# Patient Record
Sex: MALE | Race: WHITE | NOT HISPANIC OR LATINO
[De-identification: names, ages, dates, MRNs, and addresses within clinical notes are randomized per-mention and may not be internally consistent; named-entity substitution may affect disease eponyms.]

---

## 2019-11-26 PROBLEM — Z00.00 ENCOUNTER FOR PREVENTIVE HEALTH EXAMINATION: Status: ACTIVE | Noted: 2019-11-26

## 2022-11-21 ENCOUNTER — NON-APPOINTMENT (OUTPATIENT)
Age: 32
End: 2022-11-21

## 2022-11-21 ENCOUNTER — APPOINTMENT (OUTPATIENT)
Dept: CARDIOLOGY | Facility: CLINIC | Age: 32
End: 2022-11-21
Payer: COMMERCIAL

## 2022-11-21 ENCOUNTER — TRANSCRIPTION ENCOUNTER (OUTPATIENT)
Age: 32
End: 2022-11-21

## 2022-11-21 VITALS
WEIGHT: 118 LBS | BODY MASS INDEX: 18.52 KG/M2 | OXYGEN SATURATION: 99 % | SYSTOLIC BLOOD PRESSURE: 122 MMHG | TEMPERATURE: 97.8 F | DIASTOLIC BLOOD PRESSURE: 62 MMHG | HEIGHT: 67 IN

## 2022-11-21 DIAGNOSIS — Z78.9 OTHER SPECIFIED HEALTH STATUS: ICD-10-CM

## 2022-11-21 DIAGNOSIS — Z98.890 OTHER SPECIFIED POSTPROCEDURAL STATES: ICD-10-CM

## 2022-11-21 DIAGNOSIS — I37.1 NONRHEUMATIC PULMONARY VALVE INSUFFICIENCY: ICD-10-CM

## 2022-11-21 DIAGNOSIS — Q24.9 CONGENITAL MALFORMATION OF HEART, UNSPECIFIED: ICD-10-CM

## 2022-11-21 PROCEDURE — 93000 ELECTROCARDIOGRAM COMPLETE: CPT

## 2022-11-21 PROCEDURE — 99204 OFFICE O/P NEW MOD 45 MIN: CPT | Mod: 25

## 2022-11-21 NOTE — HISTORY OF PRESENT ILLNESS
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 Guallpa) about 4-5 years ago\par He was told that he developed a moderate to severe pulmonary regurgitation that would eventually need a pulmonic valve replacement. He did poorly on an cardiopulmonary fitness test and was recommended for cardiac MRI.\par He was since lost to follow up\par He notices frequent palpitations, also has some dyspnea on exertion which he feels may have worsened\par Very active job and he does not feel limited at all\par \par Pt works a  for the [employer]. Never smoker. \par \par ECG: SR with LVH, 2 PVC's\par \par

## 2022-11-21 NOTE — DISCUSSION/SUMMARY
[FreeTextEntry1] : Pt with congenital AS/PS s/p repair as an infant\par Since then he has been told of moderate-severe PI\par ECG with LVH, PVC's\par Some PVC's, [place]\par \par Will repeat TTE\par 4 day ZIO to assess PVC burden\par Consider cardiac MRI \par Likely refer to Adult congenital clinic\par  [EKG obtained to assist in diagnosis and management of assessed problem(s)] : EKG obtained to assist in diagnosis and management of assessed problem(s)

## 2022-11-21 NOTE — PHYSICAL EXAM

## 2022-11-22 ENCOUNTER — APPOINTMENT (OUTPATIENT)
Dept: INTERNAL MEDICINE | Facility: CLINIC | Age: 32
End: 2022-11-22

## 2022-11-22 PROCEDURE — 93306 TTE W/DOPPLER COMPLETE: CPT

## 2022-12-11 ENCOUNTER — NON-APPOINTMENT (OUTPATIENT)
Age: 32
End: 2022-12-11

## 2022-12-12 ENCOUNTER — NON-APPOINTMENT (OUTPATIENT)
Age: 32
End: 2022-12-12

## 2022-12-19 ENCOUNTER — APPOINTMENT (OUTPATIENT)
Dept: PEDIATRIC CARDIOLOGY | Facility: CLINIC | Age: 32
End: 2022-12-19

## 2022-12-19 VITALS — DIASTOLIC BLOOD PRESSURE: 87 MMHG | SYSTOLIC BLOOD PRESSURE: 170 MMHG

## 2022-12-19 VITALS
BODY MASS INDEX: 19.05 KG/M2 | WEIGHT: 125.66 LBS | OXYGEN SATURATION: 99 % | HEIGHT: 68.11 IN | HEART RATE: 81 BPM | DIASTOLIC BLOOD PRESSURE: 77 MMHG | SYSTOLIC BLOOD PRESSURE: 183 MMHG

## 2022-12-19 PROCEDURE — 93000 ELECTROCARDIOGRAM COMPLETE: CPT

## 2022-12-19 PROCEDURE — 99204 OFFICE O/P NEW MOD 45 MIN: CPT | Mod: 25

## 2022-12-20 NOTE — DISCUSSION/SUMMARY
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  He does have mild AS as well that was initially evaluated at the time of surgery but no intervention was deemed necessary. With his brother's recent diagnosis of Adriana's and his phenotypic characteristics, we have given him the contact information for our cardio genomics clinic so that he may be evaluated as well.   \par \par We discussed the results of his previous Holter. He is still symptomatic and the fact that he is frequently traveling overseas it would be prudent to start a low dose beta blocker at this time. He is on board with this. We discussed the side effects with this and he will notify us should he experience any of the side effects we discussed at our visit today.\par \par We did not perform an echo but reviewed. his most recent imaging from a few weeks ago. We would prefer to obtain a cardiac MRI to better quantify his RV volume and function, quantify the degree of pulmonary insufficiency, evaluate his pulmonary arteries and evaluate his AV and ascending aorta as well. Should he be nearing criteria for a pulmonary valve replacement, he may need a cardiac CT to follow so that we may actually measure his pulmonary annulus and prepare him for transcatheter valve.\par \par We reviewed the importance of meticulous dental hygiene and the need for regular dental cleanings. We will notify him once we get any results of testing and determine follow  up at that time. \par \par With his frequent travel, we have recommended that he sign up for the patient portal and additionally that he carry copies of his echocardiogram and MRI on CD.\par \par                                            [Needs SBE Prophylaxis] : [unfilled] does not need bacterial endocarditis prophylaxis [PE + No Restrictions] : [unfilled] may participate in the entire physical education program without restriction, including all varsity competitive sports. [Influenza vaccine is recommended] : Influenza vaccine is recommended

## 2022-12-20 NOTE — PHYSICAL EXAM
[General Appearance - Alert] : alert [Facies] : the head and face were normal in appearance [Sclera] : the sclera were normal [Auscultation Breath Sounds / Voice Sounds] : breath sounds clear to auscultation bilaterally [Sternotomy] : sternotomy [Well-Healed] : well-healed [Heart Rate And Rhythm] : normal heart rate and rhythm [Heart Sounds] : normal S1 and S2 [Arterial Pulses] : normal upper and lower extremity pulses with no pulse delay [Edema] : no edema [Irregularly Irregular] : the rhythm was irregularly irregular [Systolic] : systolic [LUSB] : LUSB [Ejection] : ejection [Carotids] : the murmur was transmitted to the carotid arteries [Diastolic] : diastolic [III] : a grade 3/4  [LMSB] : LMSB  [Abdomen Soft] : soft [] : no hepato-splenomegaly [Nail Clubbing] : no clubbing  or cyanosis of the fingers [Cervical Lymph Nodes Enlarged Anterior] : The anterior cervical nodes were normal [Cervical Lymph Nodes Enlarged Posterior] : The posterior cervical nodes were normal [Skin Color & Pigmentation] : normal skin color and pigmentation [Demonstrated Behavior - Infant Nonreactive To Parents] : interactive [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

## 2022-12-20 NOTE — CARDIOLOGY SUMMARY
[Today's Date] : [unfilled] [FreeTextEntry1] : sinus rhythm with premature ventricular complexes, possible left atrial enlargement, right axis, RSR" V1 consistent with RV conduction delay, LVH with repolarization abnormality [FreeTextEntry2] : Deferred as he is having an  cardiac MRI soon

## 2022-12-20 NOTE — CONSULT LETTER
[Today's Date] : [unfilled] [Name] : Name: [unfilled] [] : : ~~ [Today's Date:] : [unfilled] [Dear  ___:] : Dear Dr. [unfilled]: [Consult] : I had the pleasure of evaluating your patient, [unfilled]. My full evaluation follows. [Consult - Single Provider] : Thank you very much for allowing me to participate in the care of this patient. If you have any questions, please do not hesitate to contact me. [Sincerely,] : Sincerely, [FreeTextEntry4] : Dr Ariel Cat MD [FreeTextEntry5] : [address]  [FreeTextEntry6] : [place]  [address].

## 2022-12-20 NOTE — REVIEW OF SYSTEMS
[Wgt Loss (___ Lbs)] : recent [unfilled] lb weight loss [Palpitations] : palpitations [Shortness Of Breath] : expressed as feeling short of breath [Sleep Disturbances] : ~T sleep disturbances [Depression] : depression [Anxiety] : anxiety [Feeling Poorly] : not feeling poorly (malaise) [Cyanosis] : no cyanosis [Edema] : no edema [Diaphoresis] : not diaphoretic [Chest Pain] : no chest pain or discomfort [Exercise Intolerance] : no persistence of exercise intolerance [Cough] : no cough [Fainting (Syncope)] : no fainting [Headache] : no headache [Dizziness] : no dizziness [Easy Bruising] : no tendency for easy bruising [FreeTextEntry3] : fatigue

## 2022-12-20 NOTE — REASON FOR VISIT
[Initial Consultation] : an initial consultation for [Patient] : patient [FreeTextEntry3] : AO Stenosis, Pulm. Regurg, Ref. by Dr Cat

## 2022-12-20 NOTE — HISTORY OF PRESENT ILLNESS
[FreeTextEntry1] : We had the pleasure of seeing RAMONA GUILLEN for evaluation today in the Adult Congenital Heart Program at [facility]. Ramona is a 32 year old male with valvar PS ans mild AS. He underwent a surgical valvotomy by Dr. León on September 10, 1992. They did examine his aortic outflow tract at the time of his surgery but that needed no intervention at the time. \par \par He is active and works as a  for the [employer] which requires travel all over the world. He is currently home spending time with his father who is terminally ill. He has very recently started Bupropion for anxiety and depression which has interfered with his daily life. He has been vaccinated for Flu and COVID. He saw an adult cardiologist last week who placed a Holter that demonstrated some very short, rare runs of symptomatic SVT. An echo at that time showed severe PI with mild to moderate pulmonary stenosis and good biventricular function.\par \par Ramona has a significant family history of congenital heart disease. 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 He is interested in genetic testing. In addition to palpitations, Ramona describes fatigue, chest discomfort, dyspnea and frequent night time awakening. \par \par \par \par \par

## 2023-01-03 ENCOUNTER — OUTPATIENT (OUTPATIENT)
Dept: OUTPATIENT SERVICES | Age: 33
LOS: 1 days | End: 2023-01-03

## 2023-01-03 ENCOUNTER — APPOINTMENT (OUTPATIENT)
Dept: MRI IMAGING | Facility: HOSPITAL | Age: 33
End: 2023-01-03
Payer: COMMERCIAL

## 2023-01-03 ENCOUNTER — RESULT REVIEW (OUTPATIENT)
Age: 33
End: 2023-01-03

## 2023-01-03 DIAGNOSIS — Q25.6 STENOSIS OF PULMONARY ARTERY: ICD-10-CM

## 2023-01-03 PROCEDURE — 75565 CARD MRI VELOC FLOW MAPPING: CPT | Mod: 26

## 2023-01-03 PROCEDURE — 71555 MRI ANGIO CHEST W OR W/O DYE: CPT | Mod: 26

## 2023-01-03 PROCEDURE — 75561 CARDIAC MRI FOR MORPH W/DYE: CPT | Mod: 26

## 2023-01-05 DIAGNOSIS — I35.0 NONRHEUMATIC AORTIC (VALVE) STENOSIS: ICD-10-CM

## 2023-01-18 ENCOUNTER — APPOINTMENT (OUTPATIENT)
Dept: PEDIATRIC MEDICAL GENETICS | Facility: CLINIC | Age: 33
End: 2023-01-18
Payer: COMMERCIAL

## 2023-01-18 PROCEDURE — 96040: CPT | Mod: 95

## 2023-01-25 ENCOUNTER — OUTPATIENT (OUTPATIENT)
Dept: OUTPATIENT SERVICES | Age: 33
LOS: 1 days | End: 2023-01-25

## 2023-01-25 ENCOUNTER — APPOINTMENT (OUTPATIENT)
Dept: CT IMAGING | Facility: HOSPITAL | Age: 33
End: 2023-01-25

## 2023-01-25 DIAGNOSIS — I37.1 NONRHEUMATIC PULMONARY VALVE INSUFFICIENCY: ICD-10-CM

## 2023-03-16 ENCOUNTER — RX CHANGE (OUTPATIENT)
Age: 33
End: 2023-03-16

## 2023-03-16 RX ORDER — METOPROLOL SUCCINATE 25 MG/1
25 TABLET, EXTENDED RELEASE ORAL DAILY
Qty: 30 | Refills: 0 | Status: DISCONTINUED | COMMUNITY
Start: 2022-12-19 | End: 2023-03-16

## 2023-03-20 DIAGNOSIS — Z01.812 ENCOUNTER FOR PREPROCEDURAL LABORATORY EXAMINATION: ICD-10-CM

## 2023-04-12 ENCOUNTER — OUTPATIENT (OUTPATIENT)
Dept: OUTPATIENT SERVICES | Facility: HOSPITAL | Age: 33
LOS: 1 days | End: 2023-04-12
Payer: COMMERCIAL

## 2023-04-12 VITALS
SYSTOLIC BLOOD PRESSURE: 130 MMHG | DIASTOLIC BLOOD PRESSURE: 78 MMHG | HEART RATE: 84 BPM | RESPIRATION RATE: 18 BRPM | TEMPERATURE: 97 F | HEIGHT: 66.5 IN | WEIGHT: 130.95 LBS | OXYGEN SATURATION: 99 %

## 2023-04-12 DIAGNOSIS — Z86.79 PERSONAL HISTORY OF OTHER DISEASES OF THE CIRCULATORY SYSTEM: Chronic | ICD-10-CM

## 2023-04-12 DIAGNOSIS — Q24.9 CONGENITAL MALFORMATION OF HEART, UNSPECIFIED: ICD-10-CM

## 2023-04-12 DIAGNOSIS — I37.1 NONRHEUMATIC PULMONARY VALVE INSUFFICIENCY: ICD-10-CM

## 2023-04-12 LAB
ANION GAP SERPL CALC-SCNC: 16 MMOL/L — HIGH (ref 7–14)
BLD GP AB SCN SERPL QL: NEGATIVE — SIGNIFICANT CHANGE UP
BUN SERPL-MCNC: 12 MG/DL — SIGNIFICANT CHANGE UP (ref 7–23)
CALCIUM SERPL-MCNC: 9.4 MG/DL — SIGNIFICANT CHANGE UP (ref 8.4–10.5)
CHLORIDE SERPL-SCNC: 104 MMOL/L — SIGNIFICANT CHANGE UP (ref 98–107)
CO2 SERPL-SCNC: 18 MMOL/L — LOW (ref 22–31)
CREAT SERPL-MCNC: 0.89 MG/DL — SIGNIFICANT CHANGE UP (ref 0.5–1.3)
EGFR: 118 ML/MIN/1.73M2 — SIGNIFICANT CHANGE UP
GLUCOSE SERPL-MCNC: 99 MG/DL — SIGNIFICANT CHANGE UP (ref 70–99)
HCT VFR BLD CALC: 44 % — SIGNIFICANT CHANGE UP (ref 39–50)
HGB BLD-MCNC: 15.1 G/DL — SIGNIFICANT CHANGE UP (ref 13–17)
MCHC RBC-ENTMCNC: 30 PG — SIGNIFICANT CHANGE UP (ref 27–34)
MCHC RBC-ENTMCNC: 34.3 GM/DL — SIGNIFICANT CHANGE UP (ref 32–36)
MCV RBC AUTO: 87.5 FL — SIGNIFICANT CHANGE UP (ref 80–100)
NRBC # BLD: 0 /100 WBCS — SIGNIFICANT CHANGE UP (ref 0–0)
NRBC # FLD: 0 K/UL — SIGNIFICANT CHANGE UP (ref 0–0)
PLATELET # BLD AUTO: 207 K/UL — SIGNIFICANT CHANGE UP (ref 150–400)
POTASSIUM SERPL-MCNC: 3.5 MMOL/L — SIGNIFICANT CHANGE UP (ref 3.5–5.3)
POTASSIUM SERPL-SCNC: 3.5 MMOL/L — SIGNIFICANT CHANGE UP (ref 3.5–5.3)
RBC # BLD: 5.03 M/UL — SIGNIFICANT CHANGE UP (ref 4.2–5.8)
RBC # FLD: 11.9 % — SIGNIFICANT CHANGE UP (ref 10.3–14.5)
RH IG SCN BLD-IMP: POSITIVE — SIGNIFICANT CHANGE UP
SODIUM SERPL-SCNC: 138 MMOL/L — SIGNIFICANT CHANGE UP (ref 135–145)
WBC # BLD: 7.14 K/UL — SIGNIFICANT CHANGE UP (ref 3.8–10.5)
WBC # FLD AUTO: 7.14 K/UL — SIGNIFICANT CHANGE UP (ref 3.8–10.5)

## 2023-04-12 PROCEDURE — 93010 ELECTROCARDIOGRAM REPORT: CPT

## 2023-04-12 RX ORDER — METOPROLOL TARTRATE 50 MG
1 TABLET ORAL
Refills: 0 | DISCHARGE

## 2023-04-12 RX ORDER — BUPROPION HYDROCHLORIDE 150 MG/1
1 TABLET, EXTENDED RELEASE ORAL
Refills: 0 | DISCHARGE

## 2023-04-12 RX ORDER — MIRTAZAPINE 45 MG/1
1 TABLET, ORALLY DISINTEGRATING ORAL
Refills: 0 | DISCHARGE

## 2023-04-12 NOTE — H&P PST ADULT - PROBLEM SELECTOR PLAN 1
This is a 34 y/o male who is scheduled for cardiac catheterization.  (Await to hear from surgeon to confirm operative procedure. Patient states he is having a pulmonary valve replacement).--emailed surgeon to confirm operative procedure  * Given preop and cleanser instructions with good teach back and patient verbalized understanding    * Ordered covid-19 pcr; given phone number and places that do covid testing to be done 3-5 days before surgery This is a 32 y/o male who is scheduled for cardiac catheterization.  (Await to hear from surgeon to confirm operative procedure. Patient states he is having a pulmonary valve replacement).--emailed surgeon to confirm operative procedure  * Given preop and cleanser instructions with good teach back and patient verbalized understanding    * Ordered covid-19 pcr; given phone number and places that do covid testing to be done 3-5 days before surgery  ADDENDUM: 4-12-23  Surgeon called to state patient is having a transcatheter pulmonary valve replacement

## 2023-04-12 NOTE — H&P PST ADULT - HISTORY OF PRESENT ILLNESS
This is a 32 y/o male who presents with congenital malformation of the heart; nonrheumatic pulmonary valve insufficiency, nonrheumatic aortic valve stenosis, and stenosis of pulmonary artery. Scheduled for cardiac catheterization.

## 2023-04-12 NOTE — H&P PST ADULT - NSICDXPASTMEDICALHX_GEN_ALL_CORE_FT
PAST MEDICAL HISTORY:  Anxiety and depression     Congenital malformation of heart     History of pulmonary artery stenosis     Hypertension     Nonrheumatic aortic valve stenosis     Nonrheumatic pulmonary valve insufficiency

## 2023-04-12 NOTE — H&P PST ADULT - PROBLEM SELECTOR PLAN 2
Await prearranged cardiac evaluation with cardiologist due to congenital malformation of heart  * Await echo and old ekg for comparison from cardiologist  * Instructed to take normal am dose of bupropion the am of surgery Await prearranged cardiac evaluation with cardiologist due to congenital malformation of heart, palpitations, and c/o mid chest pain at rest as recent as six months ago  * Await echo and old ekg for comparison from cardiologist  * Instructed to take normal am dose of bupropion the am of surgery

## 2023-04-12 NOTE — H&P PST ADULT - REASON FOR ADMISSION
as per the patient, "They are doing a cardiac catheterization and pulmonary valve replacement." (Await to hear from surgeon's office to confirm operative procedure."

## 2023-04-12 NOTE — H&P PST ADULT - CARDIOVASCULAR
details… regular rate and rhythm/S1 S2 present/no gallops/no rub/no murmur regular rate and rhythm/S1 S2 present/no gallops/no rub/murmur

## 2023-04-14 ENCOUNTER — APPOINTMENT (OUTPATIENT)
Dept: PEDIATRIC CARDIOLOGY | Facility: CLINIC | Age: 33
End: 2023-04-14
Payer: COMMERCIAL

## 2023-04-14 VITALS — DIASTOLIC BLOOD PRESSURE: 66 MMHG | SYSTOLIC BLOOD PRESSURE: 155 MMHG

## 2023-04-14 VITALS
BODY MASS INDEX: 21.72 KG/M2 | DIASTOLIC BLOOD PRESSURE: 70 MMHG | HEIGHT: 66.14 IN | OXYGEN SATURATION: 99 % | SYSTOLIC BLOOD PRESSURE: 147 MMHG | WEIGHT: 135.14 LBS | HEART RATE: 70 BPM

## 2023-04-14 PROBLEM — I10 ESSENTIAL (PRIMARY) HYPERTENSION: Chronic | Status: ACTIVE | Noted: 2023-04-12

## 2023-04-14 PROBLEM — Q24.9 CONGENITAL MALFORMATION OF HEART, UNSPECIFIED: Chronic | Status: ACTIVE | Noted: 2023-04-12

## 2023-04-14 PROBLEM — I37.1 NONRHEUMATIC PULMONARY VALVE INSUFFICIENCY: Chronic | Status: ACTIVE | Noted: 2023-04-12

## 2023-04-14 PROBLEM — I35.0 NONRHEUMATIC AORTIC (VALVE) STENOSIS: Chronic | Status: ACTIVE | Noted: 2023-04-12

## 2023-04-14 PROBLEM — F41.9 ANXIETY DISORDER, UNSPECIFIED: Chronic | Status: ACTIVE | Noted: 2023-04-12

## 2023-04-14 PROBLEM — Z86.79 PERSONAL HISTORY OF OTHER DISEASES OF THE CIRCULATORY SYSTEM: Chronic | Status: ACTIVE | Noted: 2023-04-12

## 2023-04-14 PROCEDURE — 93000 ELECTROCARDIOGRAM COMPLETE: CPT

## 2023-04-14 PROCEDURE — 99203 OFFICE O/P NEW LOW 30 MIN: CPT | Mod: 25

## 2023-04-14 NOTE — CONSULT LETTER
[Today's Date] : [unfilled] [Name] : Name: [unfilled] [] : : ~~ [Today's Date:] : [unfilled] [Dear  ___:] : Dear Dr. [unfilled]: [Consult] : I had the pleasure of evaluating your patient, [unfilled]. My full evaluation follows. [Consult - Single Provider] : Thank you very much for allowing me to participate in the care of this patient. If you have any questions, please do not hesitate to contact me. [Sincerely,] : Sincerely, [FreeTextEntry4] : Dr Ariel Cat MD [FreeTextEntry5] : [address]  [FreeTextEntry6] : [place]  [address]. [de-identified] : See note for my assessment. [de-identified] : Jarad Mobley MD, MS\par Congenital Interventional Cardiologist\par Director of Pediatric Cardiac Catheterization\par [facility]\par  of Pediatrics, [facility]\par \par [facility] [place]\par [address]\[place]  [address]\par Tel: [phone]\par Fax: [phone]\par \par [email or web address] [DrNilesh  ___] : Dr. CHACKO [___] : [unfilled]

## 2023-04-14 NOTE — PHYSICAL EXAM
[General Appearance - Alert] : alert [General Appearance - In No Acute Distress] : in no acute distress [General Appearance - Well Nourished] : well nourished [General Appearance - Well Developed] : well developed [General Appearance - Well-Appearing] : well appearing [Appearance Of Head] : the head was normocephalic [Facies] : there were no dysmorphic facial features [Sclera] : the conjunctiva were normal [Outer Ear] : the ears and nose were normal in appearance [Examination Of The Oral Cavity] : mucous membranes were moist and pink [Auscultation Breath Sounds / Voice Sounds] : breath sounds clear to auscultation bilaterally [Normal Chest Appearance] : the chest was normal in appearance [Apical Impulse] : quiet precordium with normal apical impulse [Heart Rate And Rhythm] : normal heart rate and rhythm [Heart Sounds] : normal S1 and S2 [Heart Sounds Gallop] : no gallops [Heart Sounds Pericardial Friction Rub] : no pericardial rub [Heart Sounds Click] : no clicks [Arterial Pulses] : normal upper and lower extremity pulses with no pulse delay [Edema] : no edema [Capillary Refill Test] : normal capillary refill [Systolic] : systolic [II] : a grade 2/4  [Bowel Sounds] : normal bowel sounds [Abdomen Soft] : soft [Nondistended] : nondistended [Abdomen Tenderness] : non-tender [Nail Clubbing] : no clubbing  or cyanosis of the fingers [Motor Tone] : normal muscle strength and tone [] : no rash [Skin Lesions] : no lesions [Skin Turgor] : normal turgor [Demonstrated Behavior - Infant Nonreactive To Parents] : interactive [Mood] : mood and affect were appropriate for age [Demonstrated Behavior] : normal behavior

## 2023-04-14 NOTE — REASON FOR VISIT
[Initial Consultation] : an initial consultation for [Patient] : patient [FreeTextEntry1] : Pulmonary valve stenosis s/p valvotomy [FreeTextEntry3] : Pre-catheterization consultation for TPVR

## 2023-04-14 NOTE — DISCUSSION/SUMMARY
[FreeTextEntry1] : PROBLEM LIST:\par 1. Mild AI.\par 2. Congenital PS.\par    a. s/p surgical valvotomy (10-Sept-1992, [facility]).\par 3. Free PI with severe RV dilation (RVEDVi = 174 mL/m2).\par 4. SVT on B-blocker.\par 5. Possible [place]'s syndrome.\par \par I spoke with RAMONA as a pre-procedural consult, prior to his upcoming procedure with me in the catheterization laboratory.  BUTCH has congenital PS and is s/p surgical valvotomy.  Recent evaluation has revealed progressive RV dilation prompting referral for consideration of transcatheter pulmonary valve replacement (TPVR).  I have reviewed the relevant pre-procedural imaging.  I discussed the technical aspects of the procedure, including intraprocedural decision making, as well as the risks and benefits of TPVR.  Further, I briefly reviewed the option of surgical pulmonary valve replacement (PVR).  I specifically addressed the procedural risks as well as early and late-term risks of valve embolization (requiring urgent surgery), eloina-valvar leak, post-valve ventricular arrhythmias, endocarditis and valvar dysfunction.  We discussed the current TPVR options including the Medtronic [place] valve and Hill Alterra pre-stent.  The family understands that he will require ASA and SBE prophylaxis post-valve implant, and 4 weeks of arrhythmia monitoring. I addressed all questions asked.  He wishes to proceed with TPVR in the cath lab.  We have selected a date of April 18th, 2023 to move forward.\par \par Thank you for allowing us to participate in the care of this patient.  Feel free to reach out to us with any further questions or concerns.

## 2023-04-14 NOTE — CARDIOLOGY SUMMARY
[Today's Date] : [unfilled] [FreeTextEntry1] : Sinus rhythm at a rate of 72 beats per minute with a normal VT interval.  There was incomplete RBBB and LVH.  There is no evidence of atrial enlargement or pre-excitation.  The QRS axis is normal.  The QTc is within normal limits with no ST or T-wave changes. [FreeTextEntry2] : See note for details.  Briefly, free PI; mild-moderate AI. [de-identified] : 22-Nov-2022 [de-identified] : 03-Jan-2023 [de-identified] : IMPRESSION: Right ventricular outflow tract appears patent. Pulmonary valve leaflets can be visualized with loss of phase artifact suggestive of flow acceleration. Moderate-severe pulmonary regurgitation with a regurgitant fraction of 39% by PC flow and 32% by SV difference. There is flow reversal in both branch pulmonary arteries. Normal main pulmonary artery with patent and confluent branch pulmonary arteries. Differential flow estimated is 59% to the RPA and 41% to the LPA.\par \par Moderate-severely dilated right ventricular end-diastolic volume with an RVEDVi of 174 ml/m2 (z: +6.2). If ideal body weight (67 kg) calculation is used for indexing, the RV measures moderately dilated with an RVEDVi of 155 ml/m2  (z: +4.8). There is normal RV systolic function.\par \par Tricommissural aortic valve. Possible partial fusion between the right and non coronary commissures. Normal aortic root and ascending aorta Z scores based on the BSA. Mild aortic regurgitation (14% by PC flow).\par \par Mildly dilated left ventricular volume iLVEDV 131 (z: +3.3). If ideal body weight (67 kg) calculation is used for indexing, the LV measures mildly dilated with an LVEDVi of 118 ml/m2 (z: +2.4). Normal left ventricular systolic function. Diastolic septal flattening. Mildly elevated left ventricular indexed mass LV mass (gm)/ (Indexed to BSA): 140/87 (z: +2.3).

## 2023-04-18 ENCOUNTER — INPATIENT (INPATIENT)
Facility: HOSPITAL | Age: 33
LOS: 2 days | Discharge: HOME | End: 2023-04-21
Attending: INTERNAL MEDICINE | Admitting: INTERNAL MEDICINE
Payer: COMMERCIAL

## 2023-04-18 VITALS
DIASTOLIC BLOOD PRESSURE: 40 MMHG | SYSTOLIC BLOOD PRESSURE: 96 MMHG | RESPIRATION RATE: 14 BRPM | OXYGEN SATURATION: 98 % | HEART RATE: 82 BPM

## 2023-04-18 DIAGNOSIS — Z86.79 PERSONAL HISTORY OF OTHER DISEASES OF THE CIRCULATORY SYSTEM: Chronic | ICD-10-CM

## 2023-04-18 DIAGNOSIS — Q24.9 CONGENITAL MALFORMATION OF HEART, UNSPECIFIED: ICD-10-CM

## 2023-04-18 LAB — RH IG SCN BLD-IMP: POSITIVE — SIGNIFICANT CHANGE UP

## 2023-04-18 PROCEDURE — 33477 IMPLANT TCAT PULM VLV PERQ: CPT

## 2023-04-18 PROCEDURE — 93566 NJX CAR CTH SLCTV RV/RA ANG: CPT | Mod: 59

## 2023-04-18 PROCEDURE — 93568 NJX CAR CTH NSLC P-ART ANGRP: CPT | Mod: 59

## 2023-04-18 PROCEDURE — 71045 X-RAY EXAM CHEST 1 VIEW: CPT | Mod: 26

## 2023-04-18 PROCEDURE — 93597 R&L HRT CATH CHD ABNL NT CNJ: CPT | Mod: 26,59

## 2023-04-18 PROCEDURE — 93010 ELECTROCARDIOGRAM REPORT: CPT | Mod: 76

## 2023-04-18 PROCEDURE — 33477 IMPLANT TCAT PULM VLV PERQ: CPT | Mod: 82

## 2023-04-18 PROCEDURE — 75822 VEIN X-RAY ARMS/LEGS: CPT | Mod: 26

## 2023-04-18 PROCEDURE — 76937 US GUIDE VASCULAR ACCESS: CPT | Mod: 26,59

## 2023-04-18 RX ORDER — ASPIRIN/CALCIUM CARB/MAGNESIUM 324 MG
81 TABLET ORAL DAILY
Refills: 0 | Status: DISCONTINUED | OUTPATIENT
Start: 2023-04-19 | End: 2023-04-21

## 2023-04-18 RX ORDER — SODIUM CHLORIDE 9 MG/ML
3 INJECTION INTRAMUSCULAR; INTRAVENOUS; SUBCUTANEOUS EVERY 8 HOURS
Refills: 0 | Status: DISCONTINUED | OUTPATIENT
Start: 2023-04-18 | End: 2023-04-21

## 2023-04-18 RX ORDER — BUPROPION HYDROCHLORIDE 150 MG/1
150 TABLET, EXTENDED RELEASE ORAL DAILY
Refills: 0 | Status: DISCONTINUED | OUTPATIENT
Start: 2023-04-19 | End: 2023-04-21

## 2023-04-18 RX ORDER — CEFAZOLIN SODIUM 1 G
1000 VIAL (EA) INJECTION EVERY 8 HOURS
Refills: 0 | Status: COMPLETED | OUTPATIENT
Start: 2023-04-18 | End: 2023-04-19

## 2023-04-18 RX ORDER — ACETAMINOPHEN 500 MG
650 TABLET ORAL EVERY 6 HOURS
Refills: 0 | Status: DISCONTINUED | OUTPATIENT
Start: 2023-04-18 | End: 2023-04-21

## 2023-04-18 RX ORDER — METOPROLOL TARTRATE 50 MG
25 TABLET ORAL DAILY
Refills: 0 | Status: DISCONTINUED | OUTPATIENT
Start: 2023-04-18 | End: 2023-04-21

## 2023-04-18 RX ORDER — BUPROPION HYDROCHLORIDE 150 MG/1
1 TABLET, EXTENDED RELEASE ORAL
Refills: 0 | DISCHARGE

## 2023-04-18 RX ORDER — ACETAMINOPHEN 500 MG
650 TABLET ORAL ONCE
Refills: 0 | Status: COMPLETED | OUTPATIENT
Start: 2023-04-18 | End: 2023-04-18

## 2023-04-18 RX ORDER — MIRTAZAPINE 45 MG/1
1 TABLET, ORALLY DISINTEGRATING ORAL
Refills: 0 | DISCHARGE

## 2023-04-18 RX ORDER — MIRTAZAPINE 45 MG/1
30 TABLET, ORALLY DISINTEGRATING ORAL AT BEDTIME
Refills: 0 | Status: DISCONTINUED | OUTPATIENT
Start: 2023-04-18 | End: 2023-04-21

## 2023-04-18 RX ORDER — METOPROLOL TARTRATE 50 MG
1 TABLET ORAL
Refills: 0 | DISCHARGE

## 2023-04-18 RX ADMIN — SODIUM CHLORIDE 3 MILLILITER(S): 9 INJECTION INTRAMUSCULAR; INTRAVENOUS; SUBCUTANEOUS at 21:44

## 2023-04-18 RX ADMIN — Medication 650 MILLIGRAM(S): at 13:07

## 2023-04-18 RX ADMIN — Medication 650 MILLIGRAM(S): at 23:19

## 2023-04-18 RX ADMIN — Medication 25 MILLIGRAM(S): at 21:17

## 2023-04-18 RX ADMIN — Medication 650 MILLIGRAM(S): at 23:49

## 2023-04-18 RX ADMIN — MIRTAZAPINE 30 MILLIGRAM(S): 45 TABLET, ORALLY DISINTEGRATING ORAL at 21:17

## 2023-04-18 RX ADMIN — Medication 100 MILLIGRAM(S): at 18:06

## 2023-04-18 NOTE — CHART NOTE - NSCHARTNOTEFT_GEN_A_CORE
Interventional Recovery Suite Pre-Procedure PA Note    In brief, this is a 32 y/o male with a PMHx of pulmonary stenosis s/p surgical valvotomy who presents for pre-operative cardiac catheterization for surgical pulmonary valve replacement (PVR). H&P from PST reviewed. Medication reconciliation edited/updated where appropriate. Patient is not on systemic AC. Last PO intake was 4/17/2023 PM. No history of NANCY, dentures, or loose teeth. EKG reviewed. T&S sent. Peds team to consent. Interventional Recovery Suite Pre-Procedure PA Note    In brief, this is a 32 y/o male with a PMHx of pulmonary stenosis s/p surgical valvotomy who presents for cardiac catheterization and surgical pulmonary valve replacement (PVR). H&P from PST reviewed. Medication reconciliation edited/updated where appropriate. Patient is not on systemic AC. Last PO intake was 4/17/2023 PM. No history of NANCY, dentures, or loose teeth. EKG reviewed. T&S sent. Peds team to consent.

## 2023-04-18 NOTE — PROCEDURE NOTE - ADDITIONAL PROCEDURE DETAILS
Access: 6 Fr->10 Fr-> 26Fr RFV, 5 Fr LFA, 7Fr RIJ w US guidance.    Preliminary findings  Saturations (%):   SVC: 83  LPA: 82  Elizabeth: 99    Qp: 3.9 L/min/m2  Qs: 3.9 L/min/m2  Qp:Qs: 1:1    Pressures (mmHg):   Pre-intervention  SVC: 4  RA: 5/4/3  RV: 18/5  PA: 18/5/10  LPA: 18/5/10  RPA: 16/5/10    LCWP: 6/8/6, RCWP: 6/8/6    LV: 94/6, AsAo: 94/57/71, Elizabeth: 94/57/71    Post-intervention ([place] valve placement)  RV:25/5  PA: 15/7  Elizabeth: 105/58 (78)    Angiography/Interventions:  Dilated RV with pulmonary insufficiency.  He had a Medtronic [place] TPV 25 implanted without residual PI.  Post-valve implant there was 10 mmHg pressure gradient across valve post-intervention.     Assessment: Manjit is a 33 years old male with congenital PS and is s/p surgical valvotomy. Recent evaluation has revealed progressive RV dilation.  He had an invasive assessment today which demonstrated overall unremarkable hemodynamics.  He then underwent successful implant of a Medtronic [place] TPV 25 with no residual PI.    Plan:   -Transfer to [facility] recovery then admit to Cardiac ICU for continuos monitoring.  Monitor for signs of bleeding and telemetry for arrythmia.   -Lie flat with legs straight until 4:45 pm  -2 additional doses of antibiotics  -Start Aspirin 81mg daily tomorrow  -Chest xray today   -EKG today  -Echo tomorrow, to be done by pediatric cardiology  -Patient to be discharged home on continuos telemetry for the next 4 week Access: 6 Fr->10 Fr-> 26Fr RFV, 5 Fr LFA, 7Fr RIJ w US guidance. LFV occluded.    Preliminary findings  Saturations (%):   SVC: 83  LPA: 82  Elizabeth: 99    Qp: 3.9 L/min/m2  Qs: 3.9 L/min/m2  Qp:Qs: 1:1    Pressures (mmHg):   Pre-intervention  SVC: 4  RA: 5/4/3  RV: 18/5  PA: 18/5/10  LPA: 18/5/10  RPA: 16/5/10    LCWP: 6/8/6, RCWP: 6/8/6    LV: 94/6, AsAo: 94/57/71, Elizabeth: 94/57/71    Post-intervention ([place] valve placement)  RV:25/5  PA: 15/7  Elizabeth: 105/58 (78)    Angiography/Interventions:  Dilated RV with pulmonary insufficiency.  He had a Medtronic [place] TPV 25 implanted without residual PI.  Post-valve implant there was 10 mmHg pressure gradient across valve.    Assessment: Manjit is a 33 years old male with congenital PS s/p surgical valvotomy early in life.  Recent evaluation has revealed progressive RV dilation.  Invasive assessment today demonstrated overall unremarkable hemodynamics.  He then underwent successful implant of a Medtronic [place] TPV 25 with no residual PI and no significant RVOTO.    Plan:   -Transfer to [facility] recovery then admit to Cardiac ICU for continuos monitoring.  Monitor for signs of bleeding and telemetry for arrythmia.   -Lie flat with legs straight until 4:45 pm  -2 additional doses of antibiotics  -Start Aspirin 81mg daily tomorrow  -Chest xray today   -EKG today  -Echo tomorrow, to be done by pediatric cardiology  -Patient to be discharged home on continuos telemetry for 4 weeks

## 2023-04-18 NOTE — PATIENT PROFILE ADULT - NSPROPTRIGHTREPPHONE_GEN_A_NUR
[phone]
Detail Level: Zone
Other (Free Text): Pregnancy test negative\\n\\nLot: [id number]\\nExp. 04/30/2021
Note Text (......Xxx Chief Complaint.): This diagnosis correlates with the

## 2023-04-19 ENCOUNTER — TRANSCRIPTION ENCOUNTER (OUTPATIENT)
Age: 33
End: 2023-04-19

## 2023-04-19 LAB
ALBUMIN SERPL ELPH-MCNC: 4.3 G/DL — SIGNIFICANT CHANGE UP (ref 3.3–5)
ALP SERPL-CCNC: 51 U/L — SIGNIFICANT CHANGE UP (ref 40–120)
ALT FLD-CCNC: 16 U/L — SIGNIFICANT CHANGE UP (ref 4–41)
ANION GAP SERPL CALC-SCNC: 13 MMOL/L — SIGNIFICANT CHANGE UP (ref 7–14)
APPEARANCE UR: CLEAR — SIGNIFICANT CHANGE UP
AST SERPL-CCNC: 16 U/L — SIGNIFICANT CHANGE UP (ref 4–40)
B PERT DNA SPEC QL NAA+PROBE: SIGNIFICANT CHANGE UP
B PERT+PARAPERT DNA PNL SPEC NAA+PROBE: SIGNIFICANT CHANGE UP
BASOPHILS # BLD AUTO: 0.03 K/UL — SIGNIFICANT CHANGE UP (ref 0–0.2)
BASOPHILS # BLD AUTO: 0.03 K/UL — SIGNIFICANT CHANGE UP (ref 0–0.2)
BASOPHILS NFR BLD AUTO: 0.3 % — SIGNIFICANT CHANGE UP (ref 0–2)
BASOPHILS NFR BLD AUTO: 0.3 % — SIGNIFICANT CHANGE UP (ref 0–2)
BILIRUB SERPL-MCNC: 1.1 MG/DL — SIGNIFICANT CHANGE UP (ref 0.2–1.2)
BILIRUB UR-MCNC: NEGATIVE — SIGNIFICANT CHANGE UP
BORDETELLA PARAPERTUSSIS (RAPRVP): SIGNIFICANT CHANGE UP
BUN SERPL-MCNC: 9 MG/DL — SIGNIFICANT CHANGE UP (ref 7–23)
C PNEUM DNA SPEC QL NAA+PROBE: SIGNIFICANT CHANGE UP
CALCIUM SERPL-MCNC: 8.8 MG/DL — SIGNIFICANT CHANGE UP (ref 8.4–10.5)
CHLORIDE SERPL-SCNC: 104 MMOL/L — SIGNIFICANT CHANGE UP (ref 98–107)
CO2 SERPL-SCNC: 19 MMOL/L — LOW (ref 22–31)
COLOR SPEC: SIGNIFICANT CHANGE UP
CREAT SERPL-MCNC: 0.88 MG/DL — SIGNIFICANT CHANGE UP (ref 0.5–1.3)
CRP SERPL-MCNC: 84 MG/L — HIGH
DIFF PNL FLD: NEGATIVE — SIGNIFICANT CHANGE UP
EGFR: 118 ML/MIN/1.73M2 — SIGNIFICANT CHANGE UP
EOSINOPHIL # BLD AUTO: 0.01 K/UL — SIGNIFICANT CHANGE UP (ref 0–0.5)
EOSINOPHIL # BLD AUTO: 0.04 K/UL — SIGNIFICANT CHANGE UP (ref 0–0.5)
EOSINOPHIL NFR BLD AUTO: 0.1 % — SIGNIFICANT CHANGE UP (ref 0–6)
EOSINOPHIL NFR BLD AUTO: 0.4 % — SIGNIFICANT CHANGE UP (ref 0–6)
ERYTHROCYTE [SEDIMENTATION RATE] IN BLOOD: 17 MM/HR — HIGH (ref 1–15)
FLUAV SUBTYP SPEC NAA+PROBE: SIGNIFICANT CHANGE UP
FLUBV RNA SPEC QL NAA+PROBE: SIGNIFICANT CHANGE UP
GLUCOSE SERPL-MCNC: 107 MG/DL — HIGH (ref 70–99)
GLUCOSE UR QL: NEGATIVE — SIGNIFICANT CHANGE UP
HADV DNA SPEC QL NAA+PROBE: SIGNIFICANT CHANGE UP
HCOV 229E RNA SPEC QL NAA+PROBE: SIGNIFICANT CHANGE UP
HCOV HKU1 RNA SPEC QL NAA+PROBE: SIGNIFICANT CHANGE UP
HCOV NL63 RNA SPEC QL NAA+PROBE: SIGNIFICANT CHANGE UP
HCOV OC43 RNA SPEC QL NAA+PROBE: SIGNIFICANT CHANGE UP
HCT VFR BLD CALC: 38.3 % — LOW (ref 39–50)
HCT VFR BLD CALC: 40.6 % — SIGNIFICANT CHANGE UP (ref 39–50)
HGB BLD-MCNC: 13.3 G/DL — SIGNIFICANT CHANGE UP (ref 13–17)
HGB BLD-MCNC: 14.1 G/DL — SIGNIFICANT CHANGE UP (ref 13–17)
HMPV RNA SPEC QL NAA+PROBE: SIGNIFICANT CHANGE UP
HPIV1 RNA SPEC QL NAA+PROBE: SIGNIFICANT CHANGE UP
HPIV2 RNA SPEC QL NAA+PROBE: SIGNIFICANT CHANGE UP
HPIV3 RNA SPEC QL NAA+PROBE: SIGNIFICANT CHANGE UP
HPIV4 RNA SPEC QL NAA+PROBE: SIGNIFICANT CHANGE UP
IANC: 7.18 K/UL — SIGNIFICANT CHANGE UP (ref 1.8–7.4)
IANC: 8.55 K/UL — HIGH (ref 1.8–7.4)
IMM GRANULOCYTES NFR BLD AUTO: 0.5 % — SIGNIFICANT CHANGE UP (ref 0–0.9)
IMM GRANULOCYTES NFR BLD AUTO: 0.5 % — SIGNIFICANT CHANGE UP (ref 0–0.9)
KETONES UR-MCNC: ABNORMAL
LACTATE SERPL-SCNC: 0.8 MMOL/L — SIGNIFICANT CHANGE UP (ref 0.5–2)
LEUKOCYTE ESTERASE UR-ACNC: NEGATIVE — SIGNIFICANT CHANGE UP
LYMPHOCYTES # BLD AUTO: 1.03 K/UL — SIGNIFICANT CHANGE UP (ref 1–3.3)
LYMPHOCYTES # BLD AUTO: 1.74 K/UL — SIGNIFICANT CHANGE UP (ref 1–3.3)
LYMPHOCYTES # BLD AUTO: 16.4 % — SIGNIFICANT CHANGE UP (ref 13–44)
LYMPHOCYTES # BLD AUTO: 9.3 % — LOW (ref 13–44)
M PNEUMO DNA SPEC QL NAA+PROBE: SIGNIFICANT CHANGE UP
MAGNESIUM SERPL-MCNC: 1.9 MG/DL — SIGNIFICANT CHANGE UP (ref 1.6–2.6)
MANUAL SMEAR VERIFICATION: SIGNIFICANT CHANGE UP
MCHC RBC-ENTMCNC: 30.2 PG — SIGNIFICANT CHANGE UP (ref 27–34)
MCHC RBC-ENTMCNC: 30.9 PG — SIGNIFICANT CHANGE UP (ref 27–34)
MCHC RBC-ENTMCNC: 34.7 GM/DL — SIGNIFICANT CHANGE UP (ref 32–36)
MCHC RBC-ENTMCNC: 34.7 GM/DL — SIGNIFICANT CHANGE UP (ref 32–36)
MCV RBC AUTO: 87 FL — SIGNIFICANT CHANGE UP (ref 80–100)
MCV RBC AUTO: 89 FL — SIGNIFICANT CHANGE UP (ref 80–100)
MONOCYTES # BLD AUTO: 1.37 K/UL — HIGH (ref 0–0.9)
MONOCYTES # BLD AUTO: 1.55 K/UL — HIGH (ref 0–0.9)
MONOCYTES NFR BLD AUTO: 12.4 % — SIGNIFICANT CHANGE UP (ref 2–14)
MONOCYTES NFR BLD AUTO: 14.6 % — HIGH (ref 2–14)
NEUTROPHILS # BLD AUTO: 7.18 K/UL — SIGNIFICANT CHANGE UP (ref 1.8–7.4)
NEUTROPHILS # BLD AUTO: 8.55 K/UL — HIGH (ref 1.8–7.4)
NEUTROPHILS NFR BLD AUTO: 67.8 % — SIGNIFICANT CHANGE UP (ref 43–77)
NEUTROPHILS NFR BLD AUTO: 77.4 % — HIGH (ref 43–77)
NITRITE UR-MCNC: NEGATIVE — SIGNIFICANT CHANGE UP
NRBC # BLD: 0 /100 WBCS — SIGNIFICANT CHANGE UP (ref 0–0)
NRBC # BLD: 0 /100 WBCS — SIGNIFICANT CHANGE UP (ref 0–0)
NRBC # FLD: 0 K/UL — SIGNIFICANT CHANGE UP (ref 0–0)
NRBC # FLD: 0 K/UL — SIGNIFICANT CHANGE UP (ref 0–0)
PH UR: 6.5 — SIGNIFICANT CHANGE UP (ref 5–8)
PHOSPHATE SERPL-MCNC: 3.2 MG/DL — SIGNIFICANT CHANGE UP (ref 2.5–4.5)
PLAT MORPH BLD: NORMAL — SIGNIFICANT CHANGE UP
PLATELET # BLD AUTO: 128 K/UL — LOW (ref 150–400)
PLATELET # BLD AUTO: 136 K/UL — LOW (ref 150–400)
PLATELET COUNT - ESTIMATE: ABNORMAL
POTASSIUM SERPL-MCNC: 3.5 MMOL/L — SIGNIFICANT CHANGE UP (ref 3.5–5.3)
POTASSIUM SERPL-SCNC: 3.5 MMOL/L — SIGNIFICANT CHANGE UP (ref 3.5–5.3)
PROT SERPL-MCNC: 6.3 G/DL — SIGNIFICANT CHANGE UP (ref 6–8.3)
PROT UR-MCNC: ABNORMAL
RAPID RVP RESULT: SIGNIFICANT CHANGE UP
RBC # BLD: 4.4 M/UL — SIGNIFICANT CHANGE UP (ref 4.2–5.8)
RBC # BLD: 4.56 M/UL — SIGNIFICANT CHANGE UP (ref 4.2–5.8)
RBC # FLD: 11.9 % — SIGNIFICANT CHANGE UP (ref 10.3–14.5)
RBC # FLD: 12.1 % — SIGNIFICANT CHANGE UP (ref 10.3–14.5)
RBC BLD AUTO: NORMAL — SIGNIFICANT CHANGE UP
RSV RNA SPEC QL NAA+PROBE: SIGNIFICANT CHANGE UP
RV+EV RNA SPEC QL NAA+PROBE: SIGNIFICANT CHANGE UP
SARS-COV-2 RNA SPEC QL NAA+PROBE: SIGNIFICANT CHANGE UP
SODIUM SERPL-SCNC: 136 MMOL/L — SIGNIFICANT CHANGE UP (ref 135–145)
SP GR SPEC: 1.01 — SIGNIFICANT CHANGE UP (ref 1.01–1.05)
UROBILINOGEN FLD QL: SIGNIFICANT CHANGE UP
WBC # BLD: 10.59 K/UL — HIGH (ref 3.8–10.5)
WBC # BLD: 11.05 K/UL — HIGH (ref 3.8–10.5)
WBC # FLD AUTO: 10.59 K/UL — HIGH (ref 3.8–10.5)
WBC # FLD AUTO: 11.05 K/UL — HIGH (ref 3.8–10.5)

## 2023-04-19 PROCEDURE — 93303 ECHO TRANSTHORACIC: CPT | Mod: 26

## 2023-04-19 PROCEDURE — 99223 1ST HOSP IP/OBS HIGH 75: CPT

## 2023-04-19 PROCEDURE — 93325 DOPPLER ECHO COLOR FLOW MAPG: CPT | Mod: 26

## 2023-04-19 PROCEDURE — 93320 DOPPLER ECHO COMPLETE: CPT | Mod: 26

## 2023-04-19 PROCEDURE — 71045 X-RAY EXAM CHEST 1 VIEW: CPT | Mod: 26

## 2023-04-19 RX ORDER — VANCOMYCIN HCL 1 G
1000 VIAL (EA) INTRAVENOUS EVERY 12 HOURS
Refills: 0 | Status: DISCONTINUED | OUTPATIENT
Start: 2023-04-19 | End: 2023-04-21

## 2023-04-19 RX ORDER — AZTREONAM 2 G
VIAL (EA) INJECTION
Refills: 0 | Status: DISCONTINUED | OUTPATIENT
Start: 2023-04-19 | End: 2023-04-21

## 2023-04-19 RX ORDER — CHLORHEXIDINE GLUCONATE 213 G/1000ML
1 SOLUTION TOPICAL DAILY
Refills: 0 | Status: DISCONTINUED | OUTPATIENT
Start: 2023-04-19 | End: 2023-04-21

## 2023-04-19 RX ORDER — POTASSIUM CHLORIDE 20 MEQ
40 PACKET (EA) ORAL ONCE
Refills: 0 | Status: COMPLETED | OUTPATIENT
Start: 2023-04-19 | End: 2023-04-19

## 2023-04-19 RX ORDER — MAGNESIUM SULFATE 500 MG/ML
1 VIAL (ML) INJECTION ONCE
Refills: 0 | Status: COMPLETED | OUTPATIENT
Start: 2023-04-19 | End: 2023-04-19

## 2023-04-19 RX ORDER — AZTREONAM 2 G
2000 VIAL (EA) INJECTION ONCE
Refills: 0 | Status: COMPLETED | OUTPATIENT
Start: 2023-04-19 | End: 2023-04-19

## 2023-04-19 RX ORDER — LANOLIN ALCOHOL/MO/W.PET/CERES
6 CREAM (GRAM) TOPICAL ONCE
Refills: 0 | Status: COMPLETED | OUTPATIENT
Start: 2023-04-19 | End: 2023-04-19

## 2023-04-19 RX ORDER — AZTREONAM 2 G
2000 VIAL (EA) INJECTION EVERY 8 HOURS
Refills: 0 | Status: DISCONTINUED | OUTPATIENT
Start: 2023-04-20 | End: 2023-04-21

## 2023-04-19 RX ORDER — ASPIRIN/CALCIUM CARB/MAGNESIUM 324 MG
1 TABLET ORAL
Qty: 0 | Refills: 0 | DISCHARGE
Start: 2023-04-19

## 2023-04-19 RX ADMIN — Medication 40 MILLIEQUIVALENT(S): at 08:19

## 2023-04-19 RX ADMIN — Medication 650 MILLIGRAM(S): at 21:40

## 2023-04-19 RX ADMIN — SODIUM CHLORIDE 3 MILLILITER(S): 9 INJECTION INTRAMUSCULAR; INTRAVENOUS; SUBCUTANEOUS at 14:15

## 2023-04-19 RX ADMIN — Medication 100 MILLIGRAM(S): at 03:43

## 2023-04-19 RX ADMIN — MIRTAZAPINE 30 MILLIGRAM(S): 45 TABLET, ORALLY DISINTEGRATING ORAL at 21:11

## 2023-04-19 RX ADMIN — SODIUM CHLORIDE 3 MILLILITER(S): 9 INJECTION INTRAMUSCULAR; INTRAVENOUS; SUBCUTANEOUS at 06:20

## 2023-04-19 RX ADMIN — Medication 250 MILLIGRAM(S): at 22:39

## 2023-04-19 RX ADMIN — BUPROPION HYDROCHLORIDE 150 MILLIGRAM(S): 150 TABLET, EXTENDED RELEASE ORAL at 11:54

## 2023-04-19 RX ADMIN — Medication 100 GRAM(S): at 08:18

## 2023-04-19 RX ADMIN — Medication 25 MILLIGRAM(S): at 21:11

## 2023-04-19 RX ADMIN — Medication 650 MILLIGRAM(S): at 22:30

## 2023-04-19 RX ADMIN — Medication 81 MILLIGRAM(S): at 11:54

## 2023-04-19 RX ADMIN — Medication 100 MILLIGRAM(S): at 22:40

## 2023-04-19 RX ADMIN — SODIUM CHLORIDE 3 MILLILITER(S): 9 INJECTION INTRAMUSCULAR; INTRAVENOUS; SUBCUTANEOUS at 22:27

## 2023-04-19 NOTE — CHART NOTE - NSCHARTNOTEFT_GEN_A_CORE
Overnight  pt's temperature increasing over last 24 hours 100.2 --> 100.8 --> now febrile to 101 F. WBC increased from 7 to 11. UA, RVP and cxr negative for infectious process. Pt endorses HA and feeling unwell. Discussed with Pediatric cardiology. Blood cultures sent and Tylenol, vancomycin and aztreonam given empirically. Will monitor.

## 2023-04-19 NOTE — DISCHARGE NOTE PROVIDER - NSDCFUSCHEDAPPT_GEN_ALL_CORE_FT
Jarad Mobley  [facility]  PEDCARDIO [address]  Scheduled Appointment: 05/19/2023    [facility]  PEDCARDINH [address]  Scheduled Appointment: 05/19/2023

## 2023-04-19 NOTE — PROGRESS NOTE ADULT - SUBJECTIVE AND OBJECTIVE BOX
Patient is a 33y old Male who presents with a chief complaint of cardiac cath + pulmonary valve replacement    HPI:  33M w/ a PMHx of pulmonary stenosis s/p surgical valvotomy who presents for cardiac catheterization and surgical pulmonary valve replacement (PVR). H&P from PST reviewed. Medication reconciliation edited/updated where appropriate. Patient is not on systemic AC. Accepted to CCU for monitoring.     INTERVAL HPI/OVERNIGHT EVENTS:   No overnight events   Afebrile, hemodynamically stable     Subjective:      ICU Vital Signs Last 24 Hrs  T(C): 37.1 (2023 04:00), Max: 37.9 (2023 00:00)  T(F): 98.8 (2023 04:00), Max: 100.2 (2023 00:00)  HR: 82 (2023 06:00) (67 - 89)  BP: 125/62 (2023 06:00) (96/40 - 171/73)  BP(mean): 79 (2023 06:00) (56 - 96)  RR: 19 (2023 06:00) (13 - 26)  SpO2: 97% (2023 06:00) (97% - 100%)    O2 Parameters below as of 2023 19:00  Patient On (Oxygen Delivery Method): room air    I&O's Summary  2023 07:01  -  2023 07:00  --------------------------------------------------------  IN: 250 mL / OUT: 1400 mL / NET: -1150 mL    Daily Height in cm: 168.91 (2023 19:00)    Daily Weight in k (2023 06:00)    EKG/Telemetry Events:    MEDICATIONS  (STANDING):  aspirin enteric coated 81 milliGRAM(s) Oral daily  buPROPion XL (24-Hour) . 150 milliGRAM(s) Oral daily  metoprolol succinate ER 25 milliGRAM(s) Oral daily  mirtazapine 30 milliGRAM(s) Oral at bedtime  sodium chloride 0.9% lock flush 3 milliLiter(s) IV Push every 8 hours    MEDICATIONS  (PRN):  acetaminophen     Tablet .. 650 milliGRAM(s) Oral every 6 hours PRN Mild Pain (1 - 3), Moderate Pain (4 - 6)    PHYSICAL EXAM:  GENERAL: NAD, alert and interactive  HEAD: Atraumatic, normocephalic  EYES: EOMI, conjunctiva and sclera clear  NECK: Supple, no JVD  CHEST/LUNG: Clear to auscultation bilaterally; no rales, rhonchi, or wheezing; normal WOB on RA  HEART: Regular rate and rhythm; S1 S2 normal, no S3; no murmurs, rubs, or gallops  ABDOMEN: Soft, nontender, nondistended; bowel sounds present  EXTREMITIES: No clubbing, cyanosis, or edema  NEURO: Grossly nonfocal  SKIN: No rashes or lesions    LABS:             13.3   x     )-----------( x        ( 2023 06:00 )             38.3     04-19  136  |  104  |  9   ----------------------------<  107<H>  3.5   |  19<L>  |  0.88    Ca    8.8      2023 06:00  Phos  3.2     04-19  Mg     1.90     04-19    TPro  6.3  /  Alb  4.3  /  TBili  1.1  /  DBili  x   /  AST  16  /  ALT  16  /  AlkPhos  51  04-19    LIVER FUNCTIONS - ( 2023 06:00 )  Alb: 4.3 g/dL / Pro: 6.3 g/dL / ALK PHOS: 51 U/L / ALT: 16 U/L / AST: 16 U/L / GGT: x           RADIOLOGY & ADDITIONAL TESTS:  CXR:    Care Discussed with Consultants/Other Providers [ x] YES  [ ] NO Patient is a 33y old Male who presents with a chief complaint of cardiac cath + pulmonary valve replacement    HPI:  33M w/ a PMHx of pulmonary stenosis s/p surgical valvotomy who presents for cardiac catheterization and surgical pulmonary valve replacement (PVR). H&P from PST reviewed. Medication reconciliation edited/updated where appropriate. Patient is not on systemic AC. Accepted to CCU for monitoring.     INTERVAL HPI/OVERNIGHT EVENTS:   No overnight events   Afebrile, hemodynamically stable     Subjective:  Feels overall well. Reports persistent L chest pain right below nipple, non-reproducible w/ palpation, non-radiating. Additionally experienced tenderness below L ear extending to below his eye after the procedure which has since been improving. No vision or audiologic changes. Notes continued episodes of palpations decreased in frequency this morning - skipped beats, double beats, fast beats, etc. No fever, shortness of breath.    ICU Vital Signs Last 24 Hrs  T(C): 37.1 (2023 04:00), Max: 37.9 (2023 00:00)  T(F): 98.8 (2023 04:00), Max: 100.2 (2023 00:00)  HR: 82 (2023 06:00) (67 - 89)  BP: 125/62 (2023 06:00) (96/40 - 171/73)  BP(mean): 79 (2023 06:00) (56 - 96)  RR: 19 (2023 06:00) (13 - 26)  SpO2: 97% (2023 06:00) (97% - 100%)    O2 Parameters below as of 2023 19:00  Patient On (Oxygen Delivery Method): room air    I&O's Summary  2023 07:01  -  2023 07:00  --------------------------------------------------------  IN: 250 mL / OUT: 1400 mL / NET: -1150 mL    Daily Height in cm: 168.91 (2023 19:00)    Daily Weight in k (2023 06:00)    EKG/Telemetry Events:  Episodes of SVTs overnight (~5 beats t a time)    MEDICATIONS  (STANDING):  aspirin enteric coated 81 milliGRAM(s) Oral daily  buPROPion XL (24-Hour) . 150 milliGRAM(s) Oral daily  metoprolol succinate ER 25 milliGRAM(s) Oral daily  mirtazapine 30 milliGRAM(s) Oral at bedtime  sodium chloride 0.9% lock flush 3 milliLiter(s) IV Push every 8 hours    MEDICATIONS  (PRN):  acetaminophen     Tablet .. 650 milliGRAM(s) Oral every 6 hours PRN Mild Pain (1 - 3), Moderate Pain (4 - 6)    PHYSICAL EXAM:  GENERAL: NAD, alert and conversational, pleasant  HEAD: Atraumatic, normocephalic  EYES: EOMI, conjunctiva and sclera clear  CHEST/LUNG: Clear to auscultation bilaterally; no rales, rhonchi, or wheezing; normal WOB on RA  HEART: Regular rate and rhythm; S1 S2 normal, no S3; systolic murmur loudest in P/T region; no rubs, or gallops  ABDOMEN: Soft, nontender, nondistended  EXTREMITIES: No clubbing, cyanosis, or edema  NEURO: Grossly nonfocal  SKIN: No rashes or lesions    LABS:             13.3   x     )-----------( x        ( 2023 06:00 )             38.3     04-19  136  |  104  |  9   ----------------------------<  107<H>  3.5   |  19<L>  |  0.88    Ca    8.8      2023 06:00  Phos  3.2     04-19  Mg     1.90     04-19    TPro  6.3  /  Alb  4.3  /  TBili  1.1  /  DBili  x   /  AST  16  /  ALT  16  /  AlkPhos  51  04-19    LIVER FUNCTIONS - ( 2023 06:00 )  Alb: 4.3 g/dL / Pro: 6.3 g/dL / ALK PHOS: 51 U/L / ALT: 16 U/L / AST: 16 U/L / GGT: x           RADIOLOGY & ADDITIONAL TESTS:  CXR:    Care Discussed with Consultants/Other Providers [ x] YES  [ ] NO Patient is a 33y old Male who presents with a chief complaint of cardiac cath + pulmonary valve replacement    HPI:  33M w/ a PMHx of pulmonary stenosis s/p surgical valvotomy who presents for cardiac catheterization and surgical pulmonary valve replacement (PVR). H&P from PST reviewed. Medication reconciliation edited/updated where appropriate. Patient is not on systemic AC. Accepted to CCU for monitoring.     INTERVAL HPI/OVERNIGHT EVENTS:   No overnight events   Afebrile, hemodynamically stable     Subjective:  Feels overall well. Reports persistent L chest pain right below nipple, non-reproducible w/ palpation, non-radiating. Additionally experienced tenderness below L ear extending to below his eye after the procedure which has since been improving. No vision or audiologic changes. Notes continued episodes of palpations decreased in frequency this morning - skipped beats, double beats, fast beats, etc. No fever, shortness of breath.    ICU Vital Signs Last 24 Hrs  T(C): 37.1 (2023 04:00), Max: 37.9 (2023 00:00)  T(F): 98.8 (2023 04:00), Max: 100.2 (2023 00:00)  HR: 82 (2023 06:00) (67 - 89)  BP: 125/62 (2023 06:00) (96/40 - 171/73)  BP(mean): 79 (2023 06:00) (56 - 96)  RR: 19 (2023 06:00) (13 - 26)  SpO2: 97% (2023 06:00) (97% - 100%)    O2 Parameters below as of 2023 19:00  Patient On (Oxygen Delivery Method): room air    I&O's Summary  2023 07:01  -  2023 07:00  --------------------------------------------------------  IN: 250 mL / OUT: 1400 mL / NET: -1150 mL    Daily Height in cm: 168.91 (2023 19:00)    Daily Weight in k (2023 06:00)    EKG/Telemetry Events:  Episodes of nonsustained vtach overnight (~5 beats at a time)    MEDICATIONS  (STANDING):  aspirin enteric coated 81 milliGRAM(s) Oral daily  buPROPion XL (24-Hour) . 150 milliGRAM(s) Oral daily  metoprolol succinate ER 25 milliGRAM(s) Oral daily  mirtazapine 30 milliGRAM(s) Oral at bedtime  sodium chloride 0.9% lock flush 3 milliLiter(s) IV Push every 8 hours    MEDICATIONS  (PRN):  acetaminophen     Tablet .. 650 milliGRAM(s) Oral every 6 hours PRN Mild Pain (1 - 3), Moderate Pain (4 - 6)    PHYSICAL EXAM:  GENERAL: NAD, alert and conversational, pleasant  HEAD: Atraumatic, normocephalic  EYES: EOMI, conjunctiva and sclera clear  CHEST/LUNG: Clear to auscultation bilaterally; no rales, rhonchi, or wheezing; normal WOB on RA  HEART: Regular rate and rhythm; S1 S2 normal, no S3; systolic murmur loudest in P/T region; no rubs, or gallops  ABDOMEN: Soft, nontender, nondistended  EXTREMITIES: No clubbing, cyanosis, or edema  NEURO: Grossly nonfocal  SKIN: No rashes or lesions    LABS:             13.3   x     )-----------( x        ( 2023 06:00 )             38.3     04-19  136  |  104  |  9   ----------------------------<  107<H>  3.5   |  19<L>  |  0.88    Ca    8.8      2023 06:00  Phos  3.2     04-19  Mg     1.90     04-19    TPro  6.3  /  Alb  4.3  /  TBili  1.1  /  DBili  x   /  AST  16  /  ALT  16  /  AlkPhos  51  04-19    LIVER FUNCTIONS - ( 2023 06:00 )  Alb: 4.3 g/dL / Pro: 6.3 g/dL / ALK PHOS: 51 U/L / ALT: 16 U/L / AST: 16 U/L / GGT: x           RADIOLOGY & ADDITIONAL TESTS:  CXR:    Care Discussed with Consultants/Other Providers [ x] YES  [ ] NO

## 2023-04-19 NOTE — PROGRESS NOTE ADULT - ASSESSMENT
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 Recent evaluation has revealed progressive RV dilation. He had an invasive assessment on admission which demonstrated overall unremarkable hemodynamics. He underwent successful implant of a Medtronic [place] TPV 25 with no residual PI.    NEURO:  -AOx3  -continue home buproprion 150mg QD  -continue home mirtazapine 30mg QHS    CV:  #RV dilation and PV insufficiency  -s/p Medtronic [place] TPV on 4/18  -continue home toprol 25mg QD  -start asa 81mg QD  -recheck EKG today  -f/u CXR read  -echo today to be done by ped cards  -monitor for bleeding and arrhythmia  -anticipate d/c home w/ continuous tele for 4 weeks    PULM:  No active issues.    RENAL:  No active issues.    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 Recent evaluation has revealed progressive RV dilation. He had an invasive assessment on admission which demonstrated overall unremarkable hemodynamics. He underwent successful implant of a Medtronic [place] TPV 25 with no residual PI.    NEURO:  -AOx3  -continue home buproprion 150mg QD  -continue home mirtazapine 30mg QHS    CV:  #RV dilation and PV insufficiency  -s/p Medtronic [place] TPV on 4/18  -continue home toprol 25mg QD  -start asa 81mg QD  -recheck EKG today  -f/u CXR read  -echo today to be done by ped cards  -monitor for bleeding and arrhythmia  -anticipate d/c home w/ continuous tele for 4 weeks    PULM:  No active issues.    RENAL:  No active issues.    GI:  Diet: DASH/TLC    ENDO:  No active issues    HEMATOLOGIC:  -continue asa 81mg QD    ID:  -s/p 2 doses of post-op ancef    SKIN:  No active issues

## 2023-04-19 NOTE — DISCHARGE NOTE PROVIDER - ATTENDING DISCHARGE PHYSICAL EXAMINATION:
Seen and examined on day of discharge  General: well appearing, sitting up in bed and eating breakfast  Neuro: PERRL, no neck stiffness  CV: Systolic murmur  Pulmonary: CTAB

## 2023-04-19 NOTE — DISCHARGE NOTE PROVIDER - HOSPITAL COURSE
33M w/ PMHx of pulmonary stenosis s/p surgical valvotomy, recent evaluation revealing progressive RV dilation and PV insufficiency who presented for cardiac catheterization and surgical pulmonary valve replacement (PVR). Patient successfully underwent implant of a Medtronic [place] TPV 25 with no residual pulmonary insufficiency. Patient was started on asa 81mg QD and continued on toprol. Patient is now HDS and medically optimized for discharge home w/ outpatient cardiology followup. 33M w/ PMHx of pulmonary stenosis s/p surgical valvotomy, recent evaluation revealing progressive RV dilation and PV insufficiency who presented for cardiac catheterization and surgical pulmonary valve replacement (PVR). Patient successfully underwent implant of a Medtronic [place] TPV 25 with no residual pulmonary insufficiency. Patient was started on asa 81mg QD and continued on toprol. Discharge was planned however patient was noted to be febrile to 101 w/ accompanying headache, vanc/aztreonam was started. RVP/CXR/UA negative and patient w/o focal infectious symptoms, per discussion w/ pediatric cardiology fever was likely reactive to preservative from valve replacement Cultures were monitored for 24h and antibiotics were discontinued given low suspicion for infection. Patient is now HDS and medically optimized for discharge home w/ outpatient telemonitoring and cardiology followup. 33M w/ PMHx of pulmonary stenosis s/p surgical valvotomy, recent evaluation revealing progressive RV dilation and PV insufficiency who presented for cardiac catheterization and surgical pulmonary valve replacement (PVR). Patient successfully underwent implant of a Medtronic [place] TPV 25 with no residual pulmonary insufficiency, admitted to CCU for monitoring. Patient started on asa 81mg QD and continued on toprol. Discharge was planned after post-op EKG and TTE were reviewed by pediatric cardiology however patient was noted to be febrile to 101 w/ accompanying headache. Vanc/aztreonam started and patient remained in unit for further monitoring. RVP/CXR/UA negative and patient w/o focal infectious symptoms, per discussion w/ pediatric cardiology fever was likely reactive to preservative from valve replacement. Cultures were monitored for 24h and antibiotics were discontinued given low suspicion for infection. Patient is now HDS and medically optimized for discharge home w/ outpatient telemonitoring and cardiology followup. 33M w/ pulmonary stenosis s/p surgical valvotomy, recent evaluation revealing progressive RV dilation and PV insufficiency who presented for cardiac catheterization and surgical pulmonary valve replacement (PVR). Patient successfully underwent implant of a Medtronic [place] TPV 25 with no residual pulmonary insufficiency, admitted to CCU for monitoring. Patient started on asa 81mg QD and continued on metoprolol. Discharge was planned after post-op EKG and TTE were reviewed by pediatric cardiology; however patient was noted to be febrile to 101 w/ accompanying headache. Vanc/aztreonam started and patient remained in unit for further monitoring. RVP/CXR/UA negative and patient w/o focal infectious symptoms, and headache resolved and he had no further fevers. BCx were drawn and negative x24 hours at time of discharge. Per discussion w/ pediatric cardiology fever was likely reactive to preservative from valve replacement. Cultures were monitored for 24h and antibiotics were discontinued given low suspicion for infection. Patient is now HDS and medically optimized for discharge home w/ outpatient telemonitoring and cardiology follow-up. 33M w/ pulmonary stenosis s/p surgical valvotomy, recent evaluation revealing progressive RV dilation and PV insufficiency who presented for cardiac catheterization and surgical pulmonary valve replacement (PVR). Patient successfully underwent implant of a Medtronic [place] TPV 25 with no residual pulmonary insufficiency, admitted to CCU for monitoring. Patient started on asa 81mg QD and continued on metoprolol. Discharge was planned after post-op EKG and TTE were reviewed by pediatric cardiology; however patient was noted to be febrile to 101 w/ accompanying headache. Vanc/aztreonam started and patient remained in unit for further monitoring. RVP/CXR/UA negative and patient w/o focal infectious symptoms, and headache resolved and he had no further fevers. BCx were drawn and negative x24 hours at time of discharge. Per discussion w/ pediatric cardiology fever was likely reactive to preservative from valve replacement. Cultures were monitored for 24h and antibiotics were discontinued given low suspicion for infection. Case discussed with CCU team, Dr. Harris (peds cardio), and Dr. Molina (attending), labs/vitals/meds reviewed, medically optimized for discharge home w/ outpatient cardiology follow-up with Dr. Mobley.

## 2023-04-19 NOTE — CHART NOTE - NSCHARTNOTEFT_GEN_A_CORE
HPI: 34 y/o male with a PMHx of pulmonary stenosis s/p surgical valvotomy who presents for cardiac catheterization and surgical pulmonary valve replacement (PVR). H&P from PST reviewed. Medication reconciliation edited/updated where appropriate. Patient is not on systemic AC.  accepted to CCU for monitoring    Allergies    penicillin (Hives)    Intolerances    	  MEDICATIONS:  aspirin enteric coated 81 milliGRAM(s) Oral daily  metoprolol succinate ER 25 milliGRAM(s) Oral daily  acetaminophen     Tablet .. 650 milliGRAM(s) Oral every 6 hours PRN  buPROPion XL (24-Hour) . 150 milliGRAM(s) Oral daily  mirtazapine 30 milliGRAM(s) Oral at bedtime  sodium chloride 0.9% lock flush 3 milliLiter(s) IV Push every 8 hours      PAST MEDICAL & SURGICAL HISTORY:  Congenital malformation of heart  Nonrheumatic pulmonary valve insufficiency  Nonrheumatic aortic valve stenosis  History of pulmonary artery stenosis  Hypertension  Anxiety and depression  H/O aortic valve stenosis  H/O pulmonary valve stenosis      FAMILY HISTORY:  No pertinent family history in first degree relatives      SUBSTANCE USE  Tobacco Usage:  denies  Alcohol Usage: denies  Recreational drugs: denies      REVIEW OF SYSTEMS:  CV: chest pain (-), palpitation (-), orthopnea (-), PND (-), edema (-)  PULM: SOB (-), cough (-), wheezing (-), hemoptysis (-).   CONST: fever (-), chills (-) or fatigue (-)  GI: abdominal distension (-), abdominal pain (-) , nausea/vomiting (-), hematemesis, (-), melena (-), hematochezia (-)  : dysuria (-), frequency (-), hematuria (-).   NEURO: numbness (-), weakness (-), dizziness (-)  SKIN: R groin hematoma (+),  itching (-), rash (-)  HEENT:  visual changes (-); vertigo or throat pain (-);  neck stiffness (-)   All other review of systems is negative unless indicated above.      T(C): 37.1 (04-19-23 @ 04:00), Max: 37.9 (04-19-23 @ 00:00)  HR: 78 (04-19-23 @ 05:00) (67 - 89)  BP: 127/54 (04-19-23 @ 05:00) (96/40 - 171/73)  RR: 13 (04-19-23 @ 05:00) (13 - 26)  SpO2: 98% (04-19-23 @ 05:00) (97% - 100%)  Wt(kg): --  I&O's Summary    18 Apr 2023 07:01  -  19 Apr 2023 06:20  --------------------------------------------------------  IN: 150 mL / OUT: 600 mL / NET: -450 mL        Physical Exam:  General: NAD  Cardiovascular: Normal S1 S2, No JVD, No murmurs, No edema  Respiratory: Lungs clear to auscultation	  Gastrointestinal:  Soft, Non-tender, + BS	  Skin: warm and dry, No rashes, No ecchymoses, No cyanosis	  Extremities:  No clubbing, cyanosis or edema  Vascular: R groin hematoma Peripheral pulses palpable 2+ bilaterally        proBNP:   Lipid Profile:   HgA1c:   TSH:   Cardiac Enzymes: aspirin enteric coated 81 milliGRAM(s) Oral daily  metoprolol succinate ER 25 milliGRAM(s) Oral daily        Diagnostic testing:  cath:  echo:        3 years old male with congenital PS and is s/p surgical valvotomy. Recent evaluation has revealed progressive RV dilation.  He had an invasive assessment today which demonstrated overall unremarkable hemodynamics.  He then underwent successful implant of a Medtronic [place] TPV 25 with no residual PI.    Plan:   -[facility] recovery then admit to Cardiac ICU for continuos monitoring.  Monitor for signs of bleeding and telemetry for arrythmia.   -Lie flat with legs straight until 4:45 pm  -2 additional doses of antibiotics  -Start Aspirin 81mg daily tomorrow  -Chest xray today   -EKG today  -Echo tomorrow, to be done by pediatric cardiology  -Patient to be discharged home on continuos telemetry for the next 4 week      Thank you, if any questions or clinical situation changes please call spectra #[phone].  Case discussed with on call [facility] fellow     Attending Attestation to follow HPI: 32 y/o male with a PMHx of pulmonary stenosis s/p surgical valvotomy who presents for cardiac catheterization and surgical pulmonary valve replacement (PVR). H&P from PST reviewed. Medication reconciliation edited/updated where appropriate. Patient is not on systemic AC.  accepted to CCU for monitoring    Allergies    penicillin (Hives)    Intolerances    	  MEDICATIONS:  aspirin enteric coated 81 milliGRAM(s) Oral daily  metoprolol succinate ER 25 milliGRAM(s) Oral daily  acetaminophen     Tablet .. 650 milliGRAM(s) Oral every 6 hours PRN  buPROPion XL (24-Hour) . 150 milliGRAM(s) Oral daily  mirtazapine 30 milliGRAM(s) Oral at bedtime  sodium chloride 0.9% lock flush 3 milliLiter(s) IV Push every 8 hours      PAST MEDICAL & SURGICAL HISTORY:  Congenital malformation of heart  Nonrheumatic pulmonary valve insufficiency  Nonrheumatic aortic valve stenosis  History of pulmonary artery stenosis  Hypertension  Anxiety and depression  H/O aortic valve stenosis  H/O pulmonary valve stenosis      FAMILY HISTORY:  No pertinent family history in first degree relatives      SUBSTANCE USE  Tobacco Usage:  denies  Alcohol Usage: denies  Recreational drugs: denies      REVIEW OF SYSTEMS:  CV: chest pain (-), palpitation (-), orthopnea (-), PND (-), edema (-)  PULM: SOB (-), cough (-), wheezing (-), hemoptysis (-).   CONST: fever (-), chills (-) or fatigue (-)  GI: abdominal distension (-), abdominal pain (-) , nausea/vomiting (-), hematemesis, (-), melena (-), hematochezia (-)  : dysuria (-), frequency (-), hematuria (-).   NEURO: numbness (-), weakness (-), dizziness (-)  SKIN: R groin hematoma (+),  itching (-), rash (-)  HEENT:  visual changes (-); vertigo or throat pain (-);  neck stiffness (-)   All other review of systems is negative unless indicated above.      T(C): 37.1 (04-19-23 @ 04:00), Max: 37.9 (04-19-23 @ 00:00)  HR: 78 (04-19-23 @ 05:00) (67 - 89)  BP: 127/54 (04-19-23 @ 05:00) (96/40 - 171/73)  RR: 13 (04-19-23 @ 05:00) (13 - 26)  SpO2: 98% (04-19-23 @ 05:00) (97% - 100%)  Wt(kg): --  I&O's Summary    18 Apr 2023 07:01  -  19 Apr 2023 06:20  --------------------------------------------------------  IN: 150 mL / OUT: 600 mL / NET: -450 mL        Physical Exam:  General: NAD  Cardiovascular: Normal S1 S2, No JVD, No murmurs, No edema  Respiratory: Lungs clear to auscultation	  Gastrointestinal:  Soft, Non-tender, + BS	  Skin: warm and dry, No rashes, No ecchymoses, No cyanosis	  Extremities:  No clubbing, cyanosis or edema  Vascular: R groin hematoma Peripheral pulses palpable 2+ bilaterally        proBNP:   Lipid Profile:   HgA1c:   TSH:   Cardiac Enzymes: aspirin enteric coated 81 milliGRAM(s) Oral daily  metoprolol succinate ER 25 milliGRAM(s) Oral daily        Diagnostic testing:  cath: ---  echo: ---        3 years old male with congenital PS and is s/p surgical valvotomy. Recent evaluation has revealed progressive RV dilation.  He had an invasive assessment today which demonstrated overall unremarkable hemodynamics.  He then underwent successful implant of a Medtronic [place] TPV 25 with no residual PI.    Plan:   -Transfer to [facility] recovery then admit to Cardiac ICU for continuos monitoring.  Monitor for signs of bleeding and telemetry for arrythmia.   -Lie flat with legs straight until 4:45 pm  -2 additional doses of antibiotics  -Start Aspirin 81mg daily tomorrow  -Chest xray today   -EKG today  -Echo tomorrow, to be done by pediatric cardiology  -Patient to be discharged home on continuos telemetry for the next 4 week

## 2023-04-19 NOTE — DISCHARGE NOTE PROVIDER - CARE PROVIDER_API CALL
Jarad Mobley)  Pediatric Cardiology; Pediatrics  [address] [place] [address], [address]  [place] [address]  Phone: [phone]  Fax: [phone]  Established Patient  Scheduled Appointment: 05/19/2023 11:30 AM   Jarad Mobley)  Pediatric Cardiology; Pediatrics  [address] [facility], [address]  [place] [address]  Phone: [phone]  Fax: [phone]  Established Patient  Scheduled Appointment: 05/19/2023 11:30 AM    Floridalma NaqviDO)  [facility]  [address] [place] [address]  Phone: [phone]  Fax: [phone]  Established Patient  Follow Up Time: 2 weeks

## 2023-04-19 NOTE — DISCHARGE NOTE PROVIDER - NSDCCPCAREPLAN_GEN_ALL_CORE_FT
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 You were given a few doses of perioperative antibiotics and started on aspirin. Follow-up imaging with a chest x-ray and echocardiogram were reassuring, and you were cleared for discharge by the pediatric cardiology team. Please continue taking your medications (aspirin and metoprolol) as prescribed. You have a follow-up appointment with Dr. Jarad Mobley on May 19th, 11:30AM. Please be sure to see him. If you experience worsening chest pain, palpitations, shortness of breath, or lightheadedness/dizziness/fainting, please seek medical attention immediately.     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 You were given a few doses of perioperative antibiotics and started on aspirin. Follow-up imaging with a chest x-ray and echocardiogram were reassuring, and you were cleared for discharge by the pediatric cardiology team. Please continue taking your medications (aspirin and metoprolol) as prescribed. You have a follow-up appointment with Dr. Jarad Mobley on May 19th, 11:30AM. Please be sure to see him. If you experience worsening chest pain, palpitations, shortness of breath, or lightheadedness/dizziness/fainting, please seek medical attention immediately.      SECONDARY DISCHARGE DIAGNOSES  Diagnosis: Fever  Assessment and Plan of Treatment: You had a fever of 101 after your procedure. Per our discussion with Dr. Mobley, this may have been a reaction to the preservative used in the valve replacement and should improve with time. Nonetheless, an infectious workup and antibiotics were started. You do not appear to have a lung infection or a urinary tract infection. A culture of your blood did not grow any bacteria after 24 hours. There were no blood clots (which can cause fevers) in your arms and legs. Please follow up with Dr. Mobley as scheduled. If you are feeling worsening symptoms such as fevers, malaise, difficulty breathing/shortness of breath, nausea/vomiting/diarrhea, or focal pain, please seek medical attention.    Diagnosis: Blistering of skin  Assessment and Plan of Treatment: You noticed 2 mild skin blisters in your neck and groin after the procedure. This may be a mild skin allergy to adhesive. This may be worth discussing with your primary care doctor so that they are aware for any future procedures or reactions you may experience.

## 2023-04-19 NOTE — DISCHARGE NOTE PROVIDER - NSDCCPTREATMENT_GEN_ALL_CORE_FT
PRINCIPAL PROCEDURE  Procedure: Transcatheter pulmonary valve replacement  Findings and Treatment: Angiography/Interventions:  Dilated RV with pulmonary insufficiency.  He had a Medtronic [place] TPV 25 implanted without residual PI.  Post-valve implant there was 10 mmHg pressure gradient across valve.      SECONDARY PROCEDURE  Procedure: Transthoracic echocardiogram  Findings and Treatment: Summary:   1. Technically limited imaging secondary to poor acoustic windows.   2. 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   3. S/p transcatheter placement of the pulmonary valve using 25 mm [place] valve(4/18/2023).   4. The tricuspid regurgitant jet, as recorded, is inadequate for the purpose of estimating right ventricular systolic pressure.   5. Mild aortic valve regurgitation.   6. No stenosis or regurgitation detected to the [place] valve.   7.Normal left ventricular size, morphology and systolic function.   8. Normal right ventricular morphology with qualitatively normal size and systolic function.   9. No pericardial effusion.

## 2023-04-19 NOTE — DISCHARGE NOTE PROVIDER - CARE PROVIDERS DIRECT ADDRESSES
,DirectAddress_Unknown ,DirectAddress_Unknown,[email or web address]

## 2023-04-19 NOTE — DISCHARGE NOTE PROVIDER - PROVIDER TOKENS
PROVIDER:[TOKEN:[[id number]],SCHEDULEDAPPT:[05/19/2023],SCHEDULEDAPPTTIME:[11:30 AM],ESTABLISHEDPATIENT:[T]] PROVIDER:[TOKEN:[[id number]],SCHEDULEDAPPT:[05/19/2023],SCHEDULEDAPPTTIME:[11:30 AM],ESTABLISHEDPATIENT:[T]],PROVIDER:[TOKEN:[[id number]],FOLLOWUP:[2 weeks],ESTABLISHEDPATIENT:[T]]

## 2023-04-20 ENCOUNTER — APPOINTMENT (OUTPATIENT)
Dept: PEDIATRIC CARDIOLOGY | Facility: CLINIC | Age: 33
End: 2023-04-20
Payer: COMMERCIAL

## 2023-04-20 LAB
ALBUMIN SERPL ELPH-MCNC: 4.4 G/DL — SIGNIFICANT CHANGE UP (ref 3.3–5)
ALP SERPL-CCNC: 52 U/L — SIGNIFICANT CHANGE UP (ref 40–120)
ALT FLD-CCNC: 13 U/L — SIGNIFICANT CHANGE UP (ref 4–41)
ANION GAP SERPL CALC-SCNC: 12 MMOL/L — SIGNIFICANT CHANGE UP (ref 7–14)
AST SERPL-CCNC: 12 U/L — SIGNIFICANT CHANGE UP (ref 4–40)
BASOPHILS # BLD AUTO: 0.03 K/UL — SIGNIFICANT CHANGE UP (ref 0–0.2)
BASOPHILS NFR BLD AUTO: 0.3 % — SIGNIFICANT CHANGE UP (ref 0–2)
BILIRUB SERPL-MCNC: 1.3 MG/DL — HIGH (ref 0.2–1.2)
BUN SERPL-MCNC: 7 MG/DL — SIGNIFICANT CHANGE UP (ref 7–23)
CALCIUM SERPL-MCNC: 9.2 MG/DL — SIGNIFICANT CHANGE UP (ref 8.4–10.5)
CHLORIDE SERPL-SCNC: 104 MMOL/L — SIGNIFICANT CHANGE UP (ref 98–107)
CO2 SERPL-SCNC: 21 MMOL/L — LOW (ref 22–31)
CREAT SERPL-MCNC: 0.79 MG/DL — SIGNIFICANT CHANGE UP (ref 0.5–1.3)
EGFR: 122 ML/MIN/1.73M2 — SIGNIFICANT CHANGE UP
EOSINOPHIL # BLD AUTO: 0.04 K/UL — SIGNIFICANT CHANGE UP (ref 0–0.5)
EOSINOPHIL NFR BLD AUTO: 0.4 % — SIGNIFICANT CHANGE UP (ref 0–6)
GLUCOSE SERPL-MCNC: 114 MG/DL — HIGH (ref 70–99)
HCT VFR BLD CALC: 39.4 % — SIGNIFICANT CHANGE UP (ref 39–50)
HGB BLD-MCNC: 13.8 G/DL — SIGNIFICANT CHANGE UP (ref 13–17)
IANC: 8.27 K/UL — HIGH (ref 1.8–7.4)
IMM GRANULOCYTES NFR BLD AUTO: 0.6 % — SIGNIFICANT CHANGE UP (ref 0–0.9)
LACTATE SERPL-SCNC: 0.9 MMOL/L — SIGNIFICANT CHANGE UP (ref 0.5–2)
LYMPHOCYTES # BLD AUTO: 1.23 K/UL — SIGNIFICANT CHANGE UP (ref 1–3.3)
LYMPHOCYTES # BLD AUTO: 11.2 % — LOW (ref 13–44)
MAGNESIUM SERPL-MCNC: 1.9 MG/DL — SIGNIFICANT CHANGE UP (ref 1.6–2.6)
MCHC RBC-ENTMCNC: 31.3 PG — SIGNIFICANT CHANGE UP (ref 27–34)
MCHC RBC-ENTMCNC: 35 GM/DL — SIGNIFICANT CHANGE UP (ref 32–36)
MCV RBC AUTO: 89.3 FL — SIGNIFICANT CHANGE UP (ref 80–100)
MONOCYTES # BLD AUTO: 1.37 K/UL — HIGH (ref 0–0.9)
MONOCYTES NFR BLD AUTO: 12.4 % — SIGNIFICANT CHANGE UP (ref 2–14)
NEUTROPHILS # BLD AUTO: 8.27 K/UL — HIGH (ref 1.8–7.4)
NEUTROPHILS NFR BLD AUTO: 75.1 % — SIGNIFICANT CHANGE UP (ref 43–77)
NRBC # BLD: 0 /100 WBCS — SIGNIFICANT CHANGE UP (ref 0–0)
NRBC # FLD: 0 K/UL — SIGNIFICANT CHANGE UP (ref 0–0)
PHOSPHATE SERPL-MCNC: 2.3 MG/DL — LOW (ref 2.5–4.5)
PLATELET # BLD AUTO: 126 K/UL — LOW (ref 150–400)
POTASSIUM SERPL-MCNC: 3.7 MMOL/L — SIGNIFICANT CHANGE UP (ref 3.5–5.3)
POTASSIUM SERPL-SCNC: 3.7 MMOL/L — SIGNIFICANT CHANGE UP (ref 3.5–5.3)
PROT SERPL-MCNC: 6.9 G/DL — SIGNIFICANT CHANGE UP (ref 6–8.3)
RBC # BLD: 4.41 M/UL — SIGNIFICANT CHANGE UP (ref 4.2–5.8)
RBC # FLD: 12.2 % — SIGNIFICANT CHANGE UP (ref 10.3–14.5)
SODIUM SERPL-SCNC: 137 MMOL/L — SIGNIFICANT CHANGE UP (ref 135–145)
WBC # BLD: 11.01 K/UL — HIGH (ref 3.8–10.5)
WBC # FLD AUTO: 11.01 K/UL — HIGH (ref 3.8–10.5)

## 2023-04-20 PROCEDURE — 93970 EXTREMITY STUDY: CPT | Mod: 26,59

## 2023-04-20 PROCEDURE — 93272 ECG/REVIEW INTERPRET ONLY: CPT

## 2023-04-20 RX ORDER — POLYETHYLENE GLYCOL 3350 17 G/17G
17 POWDER, FOR SOLUTION ORAL DAILY
Refills: 0 | Status: DISCONTINUED | OUTPATIENT
Start: 2023-04-20 | End: 2023-04-21

## 2023-04-20 RX ORDER — LANOLIN ALCOHOL/MO/W.PET/CERES
3 CREAM (GRAM) TOPICAL AT BEDTIME
Refills: 0 | Status: DISCONTINUED | OUTPATIENT
Start: 2023-04-20 | End: 2023-04-21

## 2023-04-20 RX ADMIN — Medication 100 MILLIGRAM(S): at 22:02

## 2023-04-20 RX ADMIN — Medication 6 MILLIGRAM(S): at 00:33

## 2023-04-20 RX ADMIN — SODIUM CHLORIDE 3 MILLILITER(S): 9 INJECTION INTRAMUSCULAR; INTRAVENOUS; SUBCUTANEOUS at 22:32

## 2023-04-20 RX ADMIN — POLYETHYLENE GLYCOL 3350 17 GRAM(S): 17 POWDER, FOR SOLUTION ORAL at 11:26

## 2023-04-20 RX ADMIN — SODIUM CHLORIDE 3 MILLILITER(S): 9 INJECTION INTRAMUSCULAR; INTRAVENOUS; SUBCUTANEOUS at 07:28

## 2023-04-20 RX ADMIN — MIRTAZAPINE 30 MILLIGRAM(S): 45 TABLET, ORALLY DISINTEGRATING ORAL at 22:00

## 2023-04-20 RX ADMIN — Medication 3 MILLIGRAM(S): at 22:00

## 2023-04-20 RX ADMIN — Medication 81 MILLIGRAM(S): at 11:26

## 2023-04-20 RX ADMIN — SODIUM CHLORIDE 3 MILLILITER(S): 9 INJECTION INTRAMUSCULAR; INTRAVENOUS; SUBCUTANEOUS at 14:06

## 2023-04-20 RX ADMIN — Medication 100 MILLIGRAM(S): at 06:43

## 2023-04-20 RX ADMIN — Medication 100 MILLIGRAM(S): at 15:36

## 2023-04-20 RX ADMIN — Medication 250 MILLIGRAM(S): at 17:12

## 2023-04-20 RX ADMIN — Medication 25 MILLIGRAM(S): at 21:01

## 2023-04-20 RX ADMIN — Medication 250 MILLIGRAM(S): at 11:26

## 2023-04-20 RX ADMIN — BUPROPION HYDROCHLORIDE 150 MILLIGRAM(S): 150 TABLET, EXTENDED RELEASE ORAL at 11:26

## 2023-04-20 RX ADMIN — CHLORHEXIDINE GLUCONATE 1 APPLICATION(S): 213 SOLUTION TOPICAL at 15:37

## 2023-04-20 NOTE — CHART NOTE - NSCHARTNOTEFT_GEN_A_CORE
CCU Transfer Note    Transfer from: CCU  Transfer to:  (  ) Medicine    ( X ) Telemetry    (  ) RCU    (  ) Palliative    (  ) Stroke Unit    (  ) _______________  Accepting physician:    MEDICATIONS:  STANDING MEDICATIONS  aspirin enteric coated 81 milliGRAM(s) Oral daily  aztreonam  IVPB      aztreonam  IVPB 2000 milliGRAM(s) IV Intermittent every 8 hours  buPROPion XL (24-Hour) . 150 milliGRAM(s) Oral daily  chlorhexidine 2% Cloths 1 Application(s) Topical daily  melatonin 3 milliGRAM(s) Oral at bedtime  metoprolol succinate ER 25 milliGRAM(s) Oral daily  mirtazapine 30 milliGRAM(s) Oral at bedtime  sodium chloride 0.9% lock flush 3 milliLiter(s) IV Push every 8 hours  vancomycin  IVPB 1000 milliGRAM(s) IV Intermittent every 12 hours    PRN MEDICATIONS  acetaminophen     Tablet .. 650 milliGRAM(s) Oral every 6 hours PRN  polyethylene glycol 3350 17 Gram(s) Oral daily PRN      VITAL SIGNS: Last 24 Hours  T(C): 37.2 (2023 12:00), Max: 38.3 (2023 21:30)  T(F): 98.9 (2023 12:00), Max: 101 (2023 21:30)  HR: 79 (2023 12:00) (66 - 97)  BP: 129/64 (2023 12:00) (105/46 - 146/66)  BP(mean): 83 (2023 12:00) (63 - 98)  RR: 17 (2023 12:00) (14 - 25)  SpO2: 100% (2023 12:00) (98% - 100%)    LABS:             13.8   11.01 )-----------( 126      ( 2023 06:55 )             39.4     04-20  137  |  104  |  7   ----------------------------<  114<H>  3.7   |  21<L>  |  0.79    Ca    9.2      2023 06:55  Phos  2.3     04-20  Mg     1.90     04-    TPro  6.9  /  Alb  4.4  /  TBili  1.3<H>  /  DBili  x   /  AST  12  /  ALT  13  /  AlkPhos  52      Urinalysis Basic - ( 2023 14:20 )  Color: Light Yellow / Appearance: Clear / S.015 / pH: x  Gluc: x / Ketone: Moderate  / Bili: Negative / Urobili: <2 mg/dL   Blood: x / Protein: Trace / Nitrite: Negative   Leuk Esterase: Negative / RBC: x / WBC x   Sq Epi: x / Non Sq Epi: x / Bacteria: x    RADIOLOGY:  < from: Xray Chest 1 View- PORTABLE-Urgent (Xray Chest 1 View- PORTABLE-Urgent .) (23 @ 15:11) >  IMPRESSION: Status post [place] pulmonic valve placement.  < end of copied text >    < from: Echocardiogram, Pediatric (Echocardiogram, Pediatric .) (23 @ 10:25) >  Summary:   1. Technically limited imaging secondary to poor acoustic windows.   2. 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   3. S/p transcatheter placement of the pulmonary valve using 25 mm [place] valve(2023).   4. The tricuspid regurgitant jet, as recorded, is inadequate for the purpose of estimating right ventricular systolic pressure.   5. Mild aortic valve regurgitation.   6. No stenosis or regurgitation detected to the [place] valve.   7.Normal left ventricular size, morphology and systolic function.   8. Normal right ventricular morphology with qualitatively normal size and systolic function.   9. No pericardial effusion.  < end of copied text >    CCU COURSE:  Patient successfully underwent implant of a Medtronic [place] TPV 25 with no residual pulmonary insufficiency, admitted to CCU for monitoring. Patient started on asa 81mg QD and continued on toprol. Discharge was planned after post-op EKG and TTE were reviewed by pediatric cardiology however patient was noted to be febrile to 101 w/ accompanying headache. Vanc/aztreonam started and patient remained in unit for further monitoring. RVP/CXR/UA negative and patient w/o focal infectious symptoms, per discussion w/ pediatric cardiology fever was likely reactive to preservative from valve replacement. Patient symptoms improving, per pediatric cardiology can discharge home after BCx negative for 24h.    ASSESSMENT & PLAN:   NEURO:  -AOx3  -continue home buproprion 150mg QD  -continue home mirtazapine 30mg QHS    CV:  #RV dilation and PV insufficiency  -s/p Medtronic [place] TPV on   -continue home toprol 25mg QD  -continue asa 81mg QD  -echo  reviewed by ped cards  -monitor for bleeding and arrhythmia  -anticipate d/c home w/ continuous tele for 4 weeks    PULM:  No active issues.    RENAL:  No active issues.    GI:  Diet: DASH/TLC    ENDO:  No active issues    HEMATOLOGIC:  -continue asa 81mg QD    ID:  #Fevers  Recurrent fevers to 101 in past 24h.  - initial infectious w/u unremarkable; negative RVP, clear CXR, negative UA  - started on empiric vanc + aztreonam  - f/u BCx; monitor fever curve and WBC count  - per ped cards may be reaction to preservative  - recommend monitoring cultures for 24h, then d/c off abx if negative    SKIN:  No active issues. Small fluid-filled blister noted on R neck at site of adhesive, pt may have mild adhesive allergy.      For Follow-Up:  [ ] monitor fever curve, WBC count  [ ] if BCx negative at 24h can d/c antibiotic and discharge patient  [ ] verify w/ ped cards outpatient telemonitoring is set up CCU Transfer Note    Transfer from: CCU  Transfer to:  (  ) Medicine    ( X ) Telemetry    (  ) RCU    (  ) Palliative    (  ) Stroke Unit    (  ) _______________  Accepting physician: Dr. Arvizu (signout given  1448, pending bed)    MEDICATIONS:  STANDING MEDICATIONS  aspirin enteric coated 81 milliGRAM(s) Oral daily  aztreonam  IVPB      aztreonam  IVPB 2000 milliGRAM(s) IV Intermittent every 8 hours  buPROPion XL (24-Hour) . 150 milliGRAM(s) Oral daily  chlorhexidine 2% Cloths 1 Application(s) Topical daily  melatonin 3 milliGRAM(s) Oral at bedtime  metoprolol succinate ER 25 milliGRAM(s) Oral daily  mirtazapine 30 milliGRAM(s) Oral at bedtime  sodium chloride 0.9% lock flush 3 milliLiter(s) IV Push every 8 hours  vancomycin  IVPB 1000 milliGRAM(s) IV Intermittent every 12 hours    PRN MEDICATIONS  acetaminophen     Tablet .. 650 milliGRAM(s) Oral every 6 hours PRN  polyethylene glycol 3350 17 Gram(s) Oral daily PRN      VITAL SIGNS: Last 24 Hours  T(C): 37.2 (2023 12:00), Max: 38.3 (2023 21:30)  T(F): 98.9 (2023 12:00), Max: 101 (2023 21:30)  HR: 79 (2023 12:00) (66 - 97)  BP: 129/64 (2023 12:00) (105/46 - 146/66)  BP(mean): 83 (2023 12:00) (63 - 98)  RR: 17 (2023 12:00) (14 - 25)  SpO2: 100% (2023 12:00) (98% - 100%)    LABS:             13.8   11.01 )-----------( 126      ( 2023 06:55 )             39.4       137  |  104  |  7   ----------------------------<  114<H>  3.7   |  21<L>  |  0.79    Ca    9.2      2023 06:55  Phos  2.3     04-20  Mg     1.90     -20    TPro  6.9  /  Alb  4.4  /  TBili  1.3<H>  /  DBili  x   /  AST  12  /  ALT  13  /  AlkPhos  52  -20    Urinalysis Basic - ( 2023 14:20 )  Color: Light Yellow / Appearance: Clear / S.015 / pH: x  Gluc: x / Ketone: Moderate  / Bili: Negative / Urobili: <2 mg/dL   Blood: x / Protein: Trace / Nitrite: Negative   Leuk Esterase: Negative / RBC: x / WBC x   Sq Epi: x / Non Sq Epi: x / Bacteria: x    RADIOLOGY:  < from: Xray Chest 1 View- PORTABLE-Urgent (Xray Chest 1 View- PORTABLE-Urgent .) (23 @ 15:11) >  IMPRESSION: Status post [place] pulmonic valve placement.  < end of copied text >    < from: Echocardiogram, Pediatric (Echocardiogram, Pediatric .) (23 @ 10:25) >  Summary:   1. Technically limited imaging secondary to poor acoustic windows.   2. 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   3. S/p transcatheter placement of the pulmonary valve using 25 mm [place] valve(2023).   4. The tricuspid regurgitant jet, as recorded, is inadequate for the purpose of estimating right ventricular systolic pressure.   5. Mild aortic valve regurgitation.   6. No stenosis or regurgitation detected to the [place] valve.   7.Normal left ventricular size, morphology and systolic function.   8. Normal right ventricular morphology with qualitatively normal size and systolic function.   9. No pericardial effusion.  < end of copied text >    CCU COURSE:  Patient successfully underwent implant of a Medtronic [place] TPV 25 with no residual pulmonary insufficiency, admitted to CCU for monitoring. Patient started on asa 81mg QD and continued on toprol. Discharge was planned after post-op EKG and TTE were reviewed by pediatric cardiology however patient was noted to be febrile to 101 w/ accompanying headache. Vanc/aztreonam started and patient remained in unit for further monitoring. RVP/CXR/UA negative and patient w/o focal infectious symptoms, per discussion w/ pediatric cardiology fever was likely reactive to preservative from valve replacement. Patient symptoms improving, per pediatric cardiology can discharge home after BCx negative for 24h.    ASSESSMENT & PLAN:   NEURO:  -AOx3  -continue home buproprion 150mg QD  -continue home mirtazapine 30mg QHS    CV:  #RV dilation and PV insufficiency  -s/p Medtronic [place] TPV on   -continue home toprol 25mg QD  -continue asa 81mg QD  -echo  reviewed by ped cards  -monitor for bleeding and arrhythmia  -anticipate d/c home w/ continuous tele for 4 weeks    PULM:  No active issues.    RENAL:  No active issues.    GI:  Diet: DASH/TLC    ENDO:  No active issues    HEMATOLOGIC:  -continue asa 81mg QD    ID:  #Fevers  Recurrent fevers to 101 in past 24h.  - initial infectious w/u unremarkable; negative RVP, clear CXR, negative UA  - started on empiric vanc + aztreonam  - f/u BCx; monitor fever curve and WBC count  - per ped cards may be reaction to preservative  - recommend monitoring cultures for 24h, then d/c off abx if negative    SKIN:  No active issues. Small fluid-filled blister noted on R neck at site of adhesive, pt may have mild adhesive allergy.      For Follow-Up:  [ ] monitor fever curve, WBC count  [ ] if BCx negative at 24h can d/c antibiotic and discharge patient  [ ] verify w/ ped cards outpatient telemonitoring is set up CCU Transfer Note    Transfer from: CCU  Transfer to:  (  ) Medicine    ( X ) Telemetry   409A (  ) RCU    (  ) Palliative    (  ) Stroke Unit    (  ) _______________  Accepting physician: Dr. Arvizu (signout given  1448, pending bed)  Report given to MAR [id number], [facility] [address] [place]    MEDICATIONS:  STANDING MEDICATIONS  aspirin enteric coated 81 milliGRAM(s) Oral daily  aztreonam  IVPB      aztreonam  IVPB 2000 milliGRAM(s) IV Intermittent every 8 hours  buPROPion XL (24-Hour) . 150 milliGRAM(s) Oral daily  chlorhexidine 2% Cloths 1 Application(s) Topical daily  melatonin 3 milliGRAM(s) Oral at bedtime  metoprolol succinate ER 25 milliGRAM(s) Oral daily  mirtazapine 30 milliGRAM(s) Oral at bedtime  sodium chloride 0.9% lock flush 3 milliLiter(s) IV Push every 8 hours  vancomycin  IVPB 1000 milliGRAM(s) IV Intermittent every 12 hours    PRN MEDICATIONS  acetaminophen     Tablet .. 650 milliGRAM(s) Oral every 6 hours PRN  polyethylene glycol 3350 17 Gram(s) Oral daily PRN      VITAL SIGNS: Last 24 Hours  T(C): 37.2 (2023 12:00), Max: 38.3 (2023 21:30)  T(F): 98.9 (2023 12:00), Max: 101 (2023 21:30)  HR: 79 (2023 12:00) (66 - 97)  BP: 129/64 (2023 12:00) (105/46 - 146/66)  BP(mean): 83 (2023 12:00) (63 - 98)  RR: 17 (2023 12:00) (14 - 25)  SpO2: 100% (2023 12:00) (98% - 100%)    LABS:             13.8   11.01 )-----------( 126      ( 2023 06:55 )             39.4     04-20  137  |  104  |  7   ----------------------------<  114<H>  3.7   |  21<L>  |  0.79    Ca    9.2      2023 06:55  Phos  2.3     04-20  Mg     1.90     -20    TPro  6.9  /  Alb  4.4  /  TBili  1.3<H>  /  DBili  x   /  AST  12  /  ALT  13  /  AlkPhos  52  -20    Urinalysis Basic - ( 2023 14:20 )  Color: Light Yellow / Appearance: Clear / S.015 / pH: x  Gluc: x / Ketone: Moderate  / Bili: Negative / Urobili: <2 mg/dL   Blood: x / Protein: Trace / Nitrite: Negative   Leuk Esterase: Negative / RBC: x / WBC x   Sq Epi: x / Non Sq Epi: x / Bacteria: x    RADIOLOGY:  < from: Xray Chest 1 View- PORTABLE-Urgent (Xray Chest 1 View- PORTABLE-Urgent .) (23 @ 15:11) >  IMPRESSION: Status post [place] pulmonic valve placement.  < end of copied text >    < from: Echocardiogram, Pediatric (Echocardiogram, Pediatric .) (23 @ 10:25) >  Summary:   1. Technically limited imaging secondary to poor acoustic windows.   2. 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   3. S/p transcatheter placement of the pulmonary valve using 25 mm [place] valve(2023).   4. The tricuspid regurgitant jet, as recorded, is inadequate for the purpose of estimating right ventricular systolic pressure.   5. Mild aortic valve regurgitation.   6. No stenosis or regurgitation detected to the [place] valve.   7.Normal left ventricular size, morphology and systolic function.   8. Normal right ventricular morphology with qualitatively normal size and systolic function.   9. No pericardial effusion.  < end of copied text >    CCU COURSE:  Patient successfully underwent implant of a Medtronic [place] TPV 25 with no residual pulmonary insufficiency, admitted to CCU for monitoring. Patient started on asa 81mg QD and continued on toprol. Discharge was planned after post-op EKG and TTE were reviewed by pediatric cardiology however patient was noted to be febrile to 101 w/ accompanying headache. Vanc/aztreonam started and patient remained in unit for further monitoring. RVP/CXR/UA negative and patient w/o focal infectious symptoms, per discussion w/ pediatric cardiology fever was likely reactive to preservative from valve replacement. Patient symptoms improving, per pediatric cardiology can discharge home after BCx negative for 24h.    ASSESSMENT & PLAN:   NEURO:  -AOx3  -continue home buproprion 150mg QD  -continue home mirtazapine 30mg QHS    CV:  #RV dilation and PV insufficiency  -s/p Medtronic [place] TPV on   -continue home toprol 25mg QD  -continue asa 81mg QD  -echo  reviewed by ped cards  -monitor for bleeding and arrhythmia  -anticipate d/c home w/ continuous tele for 4 weeks    PULM:  No active issues.    RENAL:  No active issues.    GI:  Diet: DASH/TLC    ENDO:  No active issues    HEMATOLOGIC:  -continue asa 81mg QD    ID:  #Fevers  Recurrent fevers to 101 in past 24h.  - initial infectious w/u unremarkable; negative RVP, clear CXR, negative UA  - started on empiric vanc + aztreonam  - f/u BCx; monitor fever curve and WBC count  - per ped cards may be reaction to preservative  - recommend monitoring cultures for 24h, then d/c off abx if negative    SKIN:  No active issues. Small fluid-filled blister noted on R neck at site of adhesive, pt may have mild adhesive allergy.      For Follow-Up:  [ ] monitor fever curve, WBC count  [ ] if BCx negative at 24h can d/c antibiotic and discharge patient  [ ] verify w/ ped cards outpatient telemonitoring is set up

## 2023-04-20 NOTE — PROGRESS NOTE ADULT - ASSESSMENT
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 Recent evaluation has revealed progressive RV dilation. He had an invasive assessment on admission which demonstrated overall unremarkable hemodynamics. He underwent successful implant of a Medtronic [place] TPV 25 with no residual PI.    NEURO:  -AOx3  -continue home buproprion 150mg QD  -continue home mirtazapine 30mg QHS    CV:  #RV dilation and PV insufficiency  -s/p Medtronic [place] TPV on 4/18  -continue home toprol 25mg QD  -start asa 81mg QD  -recheck EKG today  -f/u CXR read  -echo today to be done by ped cards  -monitor for bleeding and arrhythmia  -anticipate d/c home w/ continuous tele for 4 weeks    PULM:  No active issues.    RENAL:  No active issues.    GI:  Diet: DASH/TLC    ENDO:  No active issues    HEMATOLOGIC:  -continue asa 81mg QD    ID:  -s/p 2 doses of post-op ancef    SKIN:  No active issues 33M with congenital PS and is s/p surgical valvotomy. Recent evaluation has revealed progressive RV dilation. He had an invasive assessment on admission which demonstrated overall unremarkable hemodynamics. He underwent successful implant of a Medtronic [place] TPV 25 with no residual PI.    NEURO:  -AOx3  -continue home buproprion 150mg QD  -continue home mirtazapine 30mg QHS    CV:  #RV dilation and PV insufficiency  -s/p Medtronic [place] TPV on 4/18  -continue home toprol 25mg QD  -continue asa 81mg QD  -echo 4/19 reviewed by ped cards  -monitor for bleeding and arrhythmia  -anticipate d/c home w/ continuous tele for 4 weeks    PULM:  No active issues.    RENAL:  No active issues.    GI:  Diet: DASH/TLC    ENDO:  No active issues    HEMATOLOGIC:  -continue asa 81mg QD    ID:  #Fevers  Recurrent fevers to 101 in past 24h.  - initial infectious w/u unremarkable; negative RVP, clear CXR, negative UA  - started on empiric vanc + aztreonam  - f/u BCx  - per ped cards may be reaction to preservative  - recommend monitoring cultures for 24h, then d/c off abx if negative    SKIN:  No active issues. Small fluid-filled blister noted on R neck at site of adhesive, pt may have mild adhesive allergy.

## 2023-04-20 NOTE — PROGRESS NOTE ADULT - ATTENDING COMMENTS
33 year old man sp [place] Valve TPVR on 4/18/23 transferred to CCU for monitoring overnight  Now doing well  On Toprol and ASA  DC planning per pediatric cardiology
33 year old man sp [place] Valve TPVR on 4/18/23 transferred to CCU for monitoring overnight  Fever overnight  Cx sent  RVP negative  Abx given  On Toprol and ASA  DC planning per pediatric cardiology

## 2023-04-20 NOTE — PROGRESS NOTE ADULT - SUBJECTIVE AND OBJECTIVE BOX
Patient is a 33y old  Male who presents with a chief complaint of cardiac cath + pulmonary valve replacement (2023 07:56)    HPI:  33M w/ a PMHx of pulmonary stenosis s/p surgical valvotomy who presents for cardiac catheterization and surgical pulmonary valve replacement (PVR). H&P from PST reviewed. Medication reconciliation edited/updated where appropriate. Patient is not on systemic AC. Accepted to CCU for monitoring.    INTERVAL HPI/OVERNIGHT EVENTS:   Febrile to 101 overnight. Vanc + aztreonam started, BCx sent.    Subjective:  Reports fever accompanied by headache yesterday. Throbbing prominent in L temple. Now improved. No chest pain, palpitations, shortness of breath, n/v/d, rash.    ICU Vital Signs Last 24 Hrs  T(C): 37.1 (2023 08:00), Max: 38.3 (2023 21:30)  T(F): 98.8 (2023 08:00), Max: 101 (2023 21:30)  HR: 95 (2023 08:00) (63 - 97)  BP: 135/85 (2023 08:00) (105/46 - 146/66)  BP(mean): 98 (2023 08:00) (63 - 98)  RR: 19 (2023 08:00) (14 - 25)  SpO2: 98% (2023 08:00) (97% - 100%)    O2 Parameters below as of 2023 08:00  Patient On (Oxygen Delivery Method): room air    I&O's Summary  2023 07:01  -  2023 07:00  --------------------------------------------------------  IN: 1140 mL / OUT: 740 mL / NET: 400 mL    Daily Weight in k.2 (2023 04:00)    EKG/Telemetry Events: occasional PVCs    MEDICATIONS  (STANDING):  aspirin enteric coated 81 milliGRAM(s) Oral daily  aztreonam  IVPB      aztreonam  IVPB 2000 milliGRAM(s) IV Intermittent every 8 hours  buPROPion XL (24-Hour) . 150 milliGRAM(s) Oral daily  chlorhexidine 2% Cloths 1 Application(s) Topical daily  melatonin 3 milliGRAM(s) Oral at bedtime  metoprolol succinate ER 25 milliGRAM(s) Oral daily  mirtazapine 30 milliGRAM(s) Oral at bedtime  sodium chloride 0.9% lock flush 3 milliLiter(s) IV Push every 8 hours  vancomycin  IVPB 1000 milliGRAM(s) IV Intermittent every 12 hours    MEDICATIONS  (PRN):  acetaminophen     Tablet .. 650 milliGRAM(s) Oral every 6 hours PRN Mild Pain (1 - 3), Moderate Pain (4 - 6)  polyethylene glycol 3350 17 Gram(s) Oral daily PRN Constipation      PHYSICAL EXAM:  GENERAL: NAD, alert and conversational, pleasant, sitting in chair watching Netflix  HEAD: Atraumatic, normocephalic  EYES: EOMI, conjunctiva and sclera clear  CHEST/LUNG: Clear to auscultation bilaterally; no rales, rhonchi, or wheezing; normal WOB on RA  HEART: Regular rate and rhythm; S1 S2 normal, no S3; no murmurs, rubs, or gallops  ABDOMEN: Soft, nontender, nondistended; bowel sounds present  EXTREMITIES: No clubbing, cyanosis, or edema  NEURO: Grossly nonfocal  SKIN: ~0.8cm fluid-filled blister on R neck, healing blister near R groin; per pt adhesive was previously at these sites; otherwise no rashes or lesions  PSYCH: Normal mood and affect    LABS:             13.8   11.01 )-----------( 126      ( 2023 06:55 )             39.4     04-20  137  |  104  |  7   ----------------------------<  114<H>  3.7   |  21<L>  |  0.79    Ca    9.2      2023 06:55  Phos  2.3     04-20  Mg     1.90     04-20    TPro  6.9  /  Alb  4.4  /  TBili  1.3<H>  /  DBili  x   /  AST  12  /  ALT  13  /  AlkPhos  52  04-20    LIVER FUNCTIONS - ( 2023 06:55 )  Alb: 4.4 g/dL / Pro: 6.9 g/dL / ALK PHOS: 52 U/L / ALT: 13 U/L / AST: 12 U/L / GGT: x           Urinalysis Basic - ( 2023 14:20 )  Color: Light Yellow / Appearance: Clear / S.015 / pH: x  Gluc: x / Ketone: Moderate  / Bili: Negative / Urobili: <2 mg/dL   Blood: x / Protein: Trace / Nitrite: Negative   Leuk Esterase: Negative / RBC: x / WBC x   Sq Epi: x / Non Sq Epi: x / Bacteria: x    RADIOLOGY & ADDITIONAL TESTS:  CXR:      Care Discussed with Consultants/Other Providers [ x] YES  [ ] NO Patient is a 33y old  Male who presents with a chief complaint of cardiac cath + pulmonary valve replacement (2023 07:56)    HPI:  33M w/ a PMHx of pulmonary stenosis s/p surgical valvotomy who presents for cardiac catheterization and surgical pulmonary valve replacement (PVR). H&P from PST reviewed. Medication reconciliation edited/updated where appropriate. Patient is not on systemic AC. Accepted to CCU for monitoring.    INTERVAL HPI/OVERNIGHT EVENTS:   Febrile to 101 overnight. Vanc + aztreonam started, BCx sent.    Subjective:  Reports fever accompanied by headache yesterday. Throbbing prominent in L temple. Now improved. No chest pain, palpitations, shortness of breath, n/v/d, rash.    ICU Vital Signs Last 24 Hrs  T(C): 37.1 (2023 08:00), Max: 38.3 (2023 21:30)  T(F): 98.8 (2023 08:00), Max: 101 (2023 21:30)  HR: 95 (2023 08:00) (63 - 97)  BP: 135/85 (2023 08:00) (105/46 - 146/66)  BP(mean): 98 (2023 08:00) (63 - 98)  RR: 19 (2023 08:00) (14 - 25)  SpO2: 98% (2023 08:00) (97% - 100%)    O2 Parameters below as of 2023 08:00  Patient On (Oxygen Delivery Method): room air    I&O's Summary  2023 07:01  -  2023 07:00  --------------------------------------------------------  IN: 1140 mL / OUT: 740 mL / NET: 400 mL    Daily Weight in k.2 (2023 04:00)    EKG/Telemetry Events: occasional PVCs    MEDICATIONS  (STANDING):  aspirin enteric coated 81 milliGRAM(s) Oral daily  aztreonam  IVPB      aztreonam  IVPB 2000 milliGRAM(s) IV Intermittent every 8 hours  buPROPion XL (24-Hour) . 150 milliGRAM(s) Oral daily  chlorhexidine 2% Cloths 1 Application(s) Topical daily  melatonin 3 milliGRAM(s) Oral at bedtime  metoprolol succinate ER 25 milliGRAM(s) Oral daily  mirtazapine 30 milliGRAM(s) Oral at bedtime  sodium chloride 0.9% lock flush 3 milliLiter(s) IV Push every 8 hours  vancomycin  IVPB 1000 milliGRAM(s) IV Intermittent every 12 hours    MEDICATIONS  (PRN):  acetaminophen     Tablet .. 650 milliGRAM(s) Oral every 6 hours PRN Mild Pain (1 - 3), Moderate Pain (4 - 6)  polyethylene glycol 3350 17 Gram(s) Oral daily PRN Constipation    PHYSICAL EXAM:  GENERAL: NAD, alert and conversational, pleasant, sitting in chair watching Netflix  HEAD: Atraumatic, normocephalic  EYES: EOMI, conjunctiva and sclera clear  CHEST/LUNG: Clear to auscultation bilaterally; no rales, rhonchi, or wheezing; normal WOB on RA  HEART: Regular rate and rhythm; S1 S2 normal, no S3; no murmurs, rubs, or gallops  ABDOMEN: Soft, nontender, nondistended; bowel sounds present  EXTREMITIES: No clubbing, cyanosis, or edema  NEURO: Grossly nonfocal  SKIN: ~0.8cm fluid-filled blister on R neck, healing blister near R groin; per pt adhesive was previously at these sites; otherwise no rashes or lesions  PSYCH: Normal mood and affect    LABS:             13.8   11.01 )-----------( 126      ( 2023 06:55 )             39.4     04-20  137  |  104  |  7   ----------------------------<  114<H>  3.7   |  21<L>  |  0.79    Ca    9.2      2023 06:55  Phos  2.3     04-20  Mg     1.90     04-20    TPro  6.9  /  Alb  4.4  /  TBili  1.3<H>  /  DBili  x   /  AST  12  /  ALT  13  /  AlkPhos  52  04-20    LIVER FUNCTIONS - ( 2023 06:55 )  Alb: 4.4 g/dL / Pro: 6.9 g/dL / ALK PHOS: 52 U/L / ALT: 13 U/L / AST: 12 U/L / GGT: x           Urinalysis Basic - ( 2023 14:20 )  Color: Light Yellow / Appearance: Clear / S.015 / pH: x  Gluc: x / Ketone: Moderate  / Bili: Negative / Urobili: <2 mg/dL   Blood: x / Protein: Trace / Nitrite: Negative   Leuk Esterase: Negative / RBC: x / WBC x   Sq Epi: x / Non Sq Epi: x / Bacteria: x    RADIOLOGY & ADDITIONAL TESTS:  CXR:  < from: Xray Chest 1 View- PORTABLE-Urgent (Xray Chest 1 View- PORTABLE-Urgent .) (23 @ 15:11) >  MPRESSION: Status post [place] pulmonic valve placement.  < end of copied text >      Care Discussed with Consultants/Other Providers [ x] YES  [ ] NO

## 2023-04-21 ENCOUNTER — TRANSCRIPTION ENCOUNTER (OUTPATIENT)
Age: 33
End: 2023-04-21

## 2023-04-21 VITALS
RESPIRATION RATE: 18 BRPM | SYSTOLIC BLOOD PRESSURE: 125 MMHG | HEART RATE: 79 BPM | TEMPERATURE: 98 F | DIASTOLIC BLOOD PRESSURE: 61 MMHG | OXYGEN SATURATION: 100 %

## 2023-04-21 LAB
ALBUMIN SERPL ELPH-MCNC: 4.2 G/DL — SIGNIFICANT CHANGE UP (ref 3.3–5)
ALP SERPL-CCNC: 47 U/L — SIGNIFICANT CHANGE UP (ref 40–120)
ALT FLD-CCNC: 12 U/L — SIGNIFICANT CHANGE UP (ref 4–41)
ANION GAP SERPL CALC-SCNC: 13 MMOL/L — SIGNIFICANT CHANGE UP (ref 7–14)
AST SERPL-CCNC: 14 U/L — SIGNIFICANT CHANGE UP (ref 4–40)
BASOPHILS # BLD AUTO: 0.03 K/UL — SIGNIFICANT CHANGE UP (ref 0–0.2)
BASOPHILS NFR BLD AUTO: 0.4 % — SIGNIFICANT CHANGE UP (ref 0–2)
BILIRUB SERPL-MCNC: 0.8 MG/DL — SIGNIFICANT CHANGE UP (ref 0.2–1.2)
BUN SERPL-MCNC: 9 MG/DL — SIGNIFICANT CHANGE UP (ref 7–23)
CALCIUM SERPL-MCNC: 9 MG/DL — SIGNIFICANT CHANGE UP (ref 8.4–10.5)
CHLORIDE SERPL-SCNC: 103 MMOL/L — SIGNIFICANT CHANGE UP (ref 98–107)
CO2 SERPL-SCNC: 21 MMOL/L — LOW (ref 22–31)
CREAT SERPL-MCNC: 0.71 MG/DL — SIGNIFICANT CHANGE UP (ref 0.5–1.3)
EGFR: 126 ML/MIN/1.73M2 — SIGNIFICANT CHANGE UP
EOSINOPHIL # BLD AUTO: 0.13 K/UL — SIGNIFICANT CHANGE UP (ref 0–0.5)
EOSINOPHIL NFR BLD AUTO: 1.7 % — SIGNIFICANT CHANGE UP (ref 0–6)
GLUCOSE SERPL-MCNC: 93 MG/DL — SIGNIFICANT CHANGE UP (ref 70–99)
HCT VFR BLD CALC: 37 % — LOW (ref 39–50)
HGB BLD-MCNC: 13.3 G/DL — SIGNIFICANT CHANGE UP (ref 13–17)
IANC: 4.72 K/UL — SIGNIFICANT CHANGE UP (ref 1.8–7.4)
IMM GRANULOCYTES NFR BLD AUTO: 0.9 % — SIGNIFICANT CHANGE UP (ref 0–0.9)
LYMPHOCYTES # BLD AUTO: 1.3 K/UL — SIGNIFICANT CHANGE UP (ref 1–3.3)
LYMPHOCYTES # BLD AUTO: 17.3 % — SIGNIFICANT CHANGE UP (ref 13–44)
MAGNESIUM SERPL-MCNC: 1.9 MG/DL — SIGNIFICANT CHANGE UP (ref 1.6–2.6)
MCHC RBC-ENTMCNC: 31.3 PG — SIGNIFICANT CHANGE UP (ref 27–34)
MCHC RBC-ENTMCNC: 35.9 GM/DL — SIGNIFICANT CHANGE UP (ref 32–36)
MCV RBC AUTO: 87.1 FL — SIGNIFICANT CHANGE UP (ref 80–100)
MONOCYTES # BLD AUTO: 1.26 K/UL — HIGH (ref 0–0.9)
MONOCYTES NFR BLD AUTO: 16.8 % — HIGH (ref 2–14)
NEUTROPHILS # BLD AUTO: 4.72 K/UL — SIGNIFICANT CHANGE UP (ref 1.8–7.4)
NEUTROPHILS NFR BLD AUTO: 62.9 % — SIGNIFICANT CHANGE UP (ref 43–77)
NRBC # BLD: 0 /100 WBCS — SIGNIFICANT CHANGE UP (ref 0–0)
NRBC # FLD: 0 K/UL — SIGNIFICANT CHANGE UP (ref 0–0)
PHOSPHATE SERPL-MCNC: 3.1 MG/DL — SIGNIFICANT CHANGE UP (ref 2.5–4.5)
PLATELET # BLD AUTO: 123 K/UL — LOW (ref 150–400)
POTASSIUM SERPL-MCNC: 3.6 MMOL/L — SIGNIFICANT CHANGE UP (ref 3.5–5.3)
POTASSIUM SERPL-SCNC: 3.6 MMOL/L — SIGNIFICANT CHANGE UP (ref 3.5–5.3)
PROT SERPL-MCNC: 6.7 G/DL — SIGNIFICANT CHANGE UP (ref 6–8.3)
RBC # BLD: 4.25 M/UL — SIGNIFICANT CHANGE UP (ref 4.2–5.8)
RBC # FLD: 12 % — SIGNIFICANT CHANGE UP (ref 10.3–14.5)
SARS-COV-2 N GENE NPH QL NAA+PROBE: NOT DETECTED
SODIUM SERPL-SCNC: 137 MMOL/L — SIGNIFICANT CHANGE UP (ref 135–145)
VANCOMYCIN TROUGH SERPL-MCNC: <4 UG/ML — LOW (ref 10–20)
WBC # BLD: 7.51 K/UL — SIGNIFICANT CHANGE UP (ref 3.8–10.5)
WBC # FLD AUTO: 7.51 K/UL — SIGNIFICANT CHANGE UP (ref 3.8–10.5)

## 2023-04-21 PROCEDURE — 99239 HOSP IP/OBS DSCHRG MGMT >30: CPT

## 2023-04-21 RX ADMIN — BUPROPION HYDROCHLORIDE 150 MILLIGRAM(S): 150 TABLET, EXTENDED RELEASE ORAL at 11:59

## 2023-04-21 RX ADMIN — CHLORHEXIDINE GLUCONATE 1 APPLICATION(S): 213 SOLUTION TOPICAL at 11:37

## 2023-04-21 RX ADMIN — Medication 81 MILLIGRAM(S): at 11:36

## 2023-04-21 RX ADMIN — Medication 100 MILLIGRAM(S): at 05:40

## 2023-04-21 RX ADMIN — SODIUM CHLORIDE 3 MILLILITER(S): 9 INJECTION INTRAMUSCULAR; INTRAVENOUS; SUBCUTANEOUS at 05:59

## 2023-04-21 NOTE — DISCHARGE NOTE NURSING/CASE MANAGEMENT/SOCIAL WORK - HAVE YOU HAD COVID IN THE LAST 60 DAYS?
Problem: At Risk for Falls  Goal: # Patient does not fall  Outcome: Outcome Not Met, Plan Adjusted     Problem: At Risk for Falls  Goal: # Takes action to control fall-related risks  Outcome: Outcome Not Met, Plan Adjusted     Problem: At Risk for Falls  Goal: # Verbalizes understanding of fall risk/precautions  Description: Document education using the patient education activity  Outcome: Outcome Not Met, Plan Adjusted     Problem: Diabetes  Goal: Glycemic balance achieved/maintained  Description: Goal is to maintain blood sugar within range with no episodes of hypoglycemia  Outcome: Outcome Not Met, Plan Adjusted     Problem: Diabetes  Goal: Verbalizes/demonstrates understanding of Diabetes  Description: Document on Patient Education Activity  Outcome: Outcome Not Met, Plan Adjusted     Problem: Diabetes  Goal: Demonstrates ability to self-administer insulin  Description: Document on Patient Education Activity  Outcome: Outcome Not Met, Plan Adjusted     Problem: Activity Intolerance  Goal: # Functional status is maintained or returned to baseline  Outcome: Outcome Not Met, Plan Adjusted     Problem: Activity Intolerance  Goal: # Tolerates activity for d/c setting with no clinical problems  Outcome: Outcome Not Met, Plan Adjusted      No

## 2023-04-21 NOTE — DISCHARGE NOTE NURSING/CASE MANAGEMENT/SOCIAL WORK - NSDCPEFALRISK_GEN_ALL_CORE
For information on Fall & Injury Prevention, visit: [email or web address] OR  [email or web address] OR  https://www.cdc.gov/steadi/patient.html

## 2023-04-21 NOTE — DISCHARGE NOTE NURSING/CASE MANAGEMENT/SOCIAL WORK - PATIENT PORTAL LINK FT
You can access the FollowMyHealth Patient Portal offered by [facility] by registering at the following website: [email or web address]. By joining [facility]’s FollowMyHealth portal, you will also be able to view your health information using other applications (apps) compatible with our system.

## 2023-04-25 LAB
CULTURE RESULTS: SIGNIFICANT CHANGE UP
CULTURE RESULTS: SIGNIFICANT CHANGE UP
SPECIMEN SOURCE: SIGNIFICANT CHANGE UP
SPECIMEN SOURCE: SIGNIFICANT CHANGE UP

## 2023-04-26 ENCOUNTER — APPOINTMENT (OUTPATIENT)
Dept: PEDIATRIC CARDIOLOGY | Facility: CLINIC | Age: 33
End: 2023-04-26

## 2023-04-26 ENCOUNTER — APPOINTMENT (OUTPATIENT)
Dept: PEDIATRIC CARDIOLOGY | Facility: CLINIC | Age: 33
End: 2023-04-26
Payer: COMMERCIAL

## 2023-04-26 VITALS
DIASTOLIC BLOOD PRESSURE: 63 MMHG | HEART RATE: 75 BPM | HEIGHT: 66.34 IN | BODY MASS INDEX: 20.8 KG/M2 | WEIGHT: 130.95 LBS | OXYGEN SATURATION: 100 % | SYSTOLIC BLOOD PRESSURE: 120 MMHG

## 2023-04-26 DIAGNOSIS — Q22.1 CONGENITAL PULMONARY VALVE STENOSIS: ICD-10-CM

## 2023-04-26 PROCEDURE — 99213 OFFICE O/P EST LOW 20 MIN: CPT | Mod: 25

## 2023-04-26 PROCEDURE — 93303 ECHO TRANSTHORACIC: CPT

## 2023-04-26 PROCEDURE — 93000 ELECTROCARDIOGRAM COMPLETE: CPT

## 2023-04-26 PROCEDURE — 93325 DOPPLER ECHO COLOR FLOW MAPG: CPT

## 2023-04-26 PROCEDURE — 93320 DOPPLER ECHO COMPLETE: CPT

## 2023-04-26 RX ORDER — MIRTAZAPINE 30 MG/1
30 TABLET, FILM COATED ORAL
Refills: 0 | Status: ACTIVE | COMMUNITY
Start: 2023-04-14

## 2023-04-26 RX ORDER — ASPIRIN 81 MG
81 TABLET, DELAYED RELEASE (ENTERIC COATED) ORAL
Refills: 0 | Status: ACTIVE | COMMUNITY

## 2023-04-26 NOTE — PHYSICAL EXAM
[General Appearance - Alert] : alert [General Appearance - In No Acute Distress] : in no acute distress [General Appearance - Well Nourished] : well nourished [General Appearance - Well Developed] : well developed [General Appearance - Well-Appearing] : well appearing [Appearance Of Head] : the head was normocephalic [Facies] : there were no dysmorphic facial features [Sclera] : the conjunctiva were normal [Outer Ear] : the ears and nose were normal in appearance [Examination Of The Oral Cavity] : mucous membranes were moist and pink [Auscultation Breath Sounds / Voice Sounds] : breath sounds clear to auscultation bilaterally [Normal Chest Appearance] : the chest was normal in appearance [Apical Impulse] : quiet precordium with normal apical impulse [Heart Rate And Rhythm] : normal heart rate and rhythm [Heart Sounds] : normal S1 and S2 [Heart Sounds Gallop] : no gallops [Heart Sounds Pericardial Friction Rub] : no pericardial rub [Heart Sounds Click] : no clicks [Arterial Pulses] : normal upper and lower extremity pulses with no pulse delay [Edema] : no edema [Capillary Refill Test] : normal capillary refill [Systolic] : systolic [I] : a grade 1/6  [Ejection] : ejection [II] : a grade 2/4  [Bowel Sounds] : normal bowel sounds [Abdomen Soft] : soft [Nondistended] : nondistended [Abdomen Tenderness] : non-tender [Nail Clubbing] : no clubbing  or cyanosis of the fingers [Motor Tone] : normal muscle strength and tone [] : no rash [Skin Lesions] : no lesions [Skin Turgor] : normal turgor [Demonstrated Behavior - Infant Nonreactive To Parents] : interactive [Mood] : mood and affect were appropriate for age [Demonstrated Behavior] : normal behavior [FreeTextEntry1] : Significant bruising around the left groin site

## 2023-04-26 NOTE — REVIEW OF SYSTEMS
[Palpitations] : palpitations [Feeling Poorly] : not feeling poorly (malaise) [Fever] : no fever [Eye Discharge] : no eye discharge [Nasal Stuffiness] : no nasal congestion [Sore Throat] : no sore throat [Cyanosis] : no cyanosis [Edema] : no edema [Diaphoresis] : not diaphoretic [Chest Pain] : no chest pain or discomfort [Exercise Intolerance] : no persistence of exercise intolerance [Orthopnea] : no orthopnea [Fast HR] : no tachycardia [Tachypnea] : not tachypneic [Wheezing] : no wheezing [Cough] : no cough [Shortness Of Breath] : not expressed as feeling short of breath [Vomiting] : no vomiting [Diarrhea] : no diarrhea [Abdominal Pain] : no abdominal pain [Decrease In Appetite] : appetite not decreased [Fainting (Syncope)] : no fainting [Headache] : no headache [Dizziness] : no dizziness [Limping] : no limping [Rash] : no rash [Wound problems] : no wound problems [Easy Bruising] : no tendency for easy bruising [Easy Bleeding] : no ~M tendency for easy bleeding [Sleep Disturbances] : ~T no sleep disturbances [Hyperactive] : no hyperactive behavior [Depression] : no depression [Anxiety] : no anxiety

## 2023-04-26 NOTE — REASON FOR VISIT
[Patient] : patient [S/P Catheterization] : status post catheterization [Follow-Up] : a follow-up visit for [FreeTextEntry1] : Pulmonary valve stenosis s/p valvotomy [FreeTextEntry3] : post-cath visit after TPVR

## 2023-04-26 NOTE — CONSULT LETTER
[Today's Date] : [unfilled] [Name] : Name: [unfilled] [] : : ~~ [Today's Date:] : [unfilled] [Dear  ___:] : Dear Dr. [unfilled]: [Consult] : I had the pleasure of evaluating your patient, [unfilled]. My full evaluation follows. [Consult - Single Provider] : Thank you very much for allowing me to participate in the care of this patient. If you have any questions, please do not hesitate to contact me. [Sincerely,] : Sincerely, [DrNilesh  ___] : Dr. CHACKO [___] : [unfilled] [FreeTextEntry4] : Dr Ariel Cat MD [FreeTextEntry5] : [address]  [FreeTextEntry6] : [place]  [address]. [de-identified] : See note for my assessment. [de-identified] : Jarad Mobley MD, MS\par Congenital Interventional Cardiologist\par Director of Pediatric Cardiac Catheterization\par [facility]\par  of Pediatrics, [facility] at [facility]\par \par Juan M [facility] [place]\par [address]\[place]  [address]\par Tel: [phone]\par Fax: [phone]\par \par [email or web address]

## 2023-04-26 NOTE — DISCUSSION/SUMMARY
[FreeTextEntry1] : PROBLEM LIST:\par 1. Mild AI.\par 2. Congenital PS.\par    a. s/p surgical valvotomy (10-Sept-1992, Mau).\par    b. s/p implant of [place] TPV 25 (18-Apr-2023, Itz) for severe RV dilation.\par 3. 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 No PS or PI.\par \par In summary, RAMONA is a 33 year M with congenital PS s/p surgical valvotomy who presents for initial post-procedure follow up after undergoing percutaneous implant of a [place] TPV 25.  His history, physical exam, EKG, and echocardiogram are reassuring.  Specifically, he has not had any ventricular arrhythmias and his [place] valve is working well with no stenosis or insufficiency.  I will plan to see him in ~4 weeks (May 19th) with an ecg and echocardiogram and after that he will resume routine follow up with the [facility] service.  He should continue ASA and his other medications.\par \par In the interim, if there are any further questions, concerns or changes in his clinical status we would be happy to see him at that time.  The patient was instructed to return if he develops chest pain, palpitations, cyanosis, respiratory distress, exercise intolerance, syncope or any other concerning symptoms.  He does require SBE prophylaxis but no activity limitations.  He expressed understanding of and agreement with the plan.  All questions were answered.\par \par Thank you for allowing us to participate in the care of this patient.  Feel free to reach out to us with any further questions or concerns. [Needs SBE Prophylaxis] : [unfilled]  needs bacterial endocarditis prophylaxis. SBE prophylaxis is indicated for dental and invasive ENT procedures. (Circulation. 2007; 116: 1305-4857) [May participate in all age-appropriate activities] : [unfilled] May participate in all age-appropriate activities.

## 2023-04-26 NOTE — CARDIOLOGY SUMMARY
[Today's Date] : [unfilled] [FreeTextEntry1] : Sinus rhythm at a rate of 64 beats per minute with a normal SC interval.  There was incomplete RBBB and LVH.  There is no evidence of atrial enlargement or pre-excitation.  The QRS axis is normal.  The QTc is within normal limits with no ST or T-wave changes. [FreeTextEntry2] : See note for details.  Briefly, normally functioning [place] valve with no PS/PI. [de-identified] : 03-Jan-2023 [de-identified] : IMPRESSION: Right ventricular outflow tract appears patent. Pulmonary valve leaflets can be visualized with loss of phase artifact suggestive of flow acceleration. Moderate-severe pulmonary regurgitation with a regurgitant fraction of 39% by PC flow and 32% by SV difference. There is flow reversal in both branch pulmonary arteries. Normal main pulmonary artery with patent and confluent branch pulmonary arteries. Differential flow estimated is 59% to the RPA and 41% to the LPA.\par \par Moderate-severely dilated right ventricular end-diastolic volume with an RVEDVi of 174 ml/m2 (z: +6.2). If ideal body weight (67 kg) calculation is used for indexing, the RV measures moderately dilated with an RVEDVi of 155 ml/m2  (z: +4.8). There is normal RV systolic function.\par \par Tricommissural aortic valve. Possible partial fusion between the right and non coronary commissures. Normal aortic root and ascending aorta Z scores based on the BSA. Mild aortic regurgitation (14% by PC flow).\par \par Mildly dilated left ventricular volume iLVEDV 131 (z: +3.3). If ideal body weight (67 kg) calculation is used for indexing, the LV measures mildly dilated with an LVEDVi of 118 ml/m2 (z: +2.4). Normal left ventricular systolic function. Diastolic septal flattening. Mildly elevated left ventricular indexed mass LV mass (gm)/ (Indexed to BSA): 140/87 (z: +2.3). [de-identified] : MCOT monitor without documented arrhythmias to date.

## 2023-04-26 NOTE — HISTORY OF PRESENT ILLNESS
[FreeTextEntry1] : I had the pleasure of seeing RAMONA GUILLEN in the pediatric cardiology clinic of [facility] on April 26, 2023.  As you know, RAMONA is a 33 year M with valvar PS s/p surgical valvotomy on September 10, 1992 with Dr. León.  He recently underwent implant of a [place] TPV 25 and is presenting for post-cath consultation.  His post-procedure course was complicated by fever and chills likely related to residual preservative in the valve fabric.  He did undergo a work up for bacterial infection which was negative.  Since discharge from the hospital he has been well with no significant concerns.  He has not had any fevers or chills (he has been checking his temperature daily).  He has not had chest pain, palpitations, syncope, respiratory distress, or cyanosis.  He reports that he is able to take full breaths more easily now.\par

## 2023-05-12 ENCOUNTER — APPOINTMENT (OUTPATIENT)
Dept: PEDIATRIC CARDIOLOGY | Facility: CLINIC | Age: 33
End: 2023-05-12
Payer: COMMERCIAL

## 2023-05-12 VITALS
DIASTOLIC BLOOD PRESSURE: 64 MMHG | OXYGEN SATURATION: 99 % | BODY MASS INDEX: 21.16 KG/M2 | HEART RATE: 58 BPM | WEIGHT: 130.07 LBS | SYSTOLIC BLOOD PRESSURE: 148 MMHG | HEIGHT: 65.75 IN

## 2023-05-12 DIAGNOSIS — Z95.2 PRESENCE OF PROSTHETIC HEART VALVE: ICD-10-CM

## 2023-05-12 DIAGNOSIS — Q25.6 STENOSIS OF PULMONARY ARTERY: ICD-10-CM

## 2023-05-12 PROCEDURE — 93000 ELECTROCARDIOGRAM COMPLETE: CPT

## 2023-05-12 PROCEDURE — 99213 OFFICE O/P EST LOW 20 MIN: CPT | Mod: 25

## 2023-05-12 PROCEDURE — 93303 ECHO TRANSTHORACIC: CPT

## 2023-05-12 PROCEDURE — 93325 DOPPLER ECHO COLOR FLOW MAPG: CPT

## 2023-05-12 PROCEDURE — 93320 DOPPLER ECHO COMPLETE: CPT

## 2023-05-12 NOTE — CONSULT LETTER
[Today's Date] : [unfilled] [Name] : Name: [unfilled] [] : : ~~ [Today's Date:] : [unfilled] [Dear  ___:] : Dear Dr. [unfilled]: [Consult] : I had the pleasure of evaluating your patient, [unfilled]. My full evaluation follows. [Consult - Single Provider] : Thank you very much for allowing me to participate in the care of this patient. If you have any questions, please do not hesitate to contact me. [Sincerely,] : Sincerely, [DrNilesh  ___] : Dr. CHACKO [FreeTextEntry4] : Dr Ariel Cat MD [FreeTextEntry5] : [address]  [FreeTextEntry6] : [place]  [address]. [de-identified] : See note for my assessment. [de-identified] : Jarad Mobley MD, MS\par Congenital Interventional Cardiologist\par Director of Pediatric Cardiac Catheterization\par [facility]\par  of Pediatrics, [facility] Medicine at [facility]\par \par [facility] [place]\par [address]\[place]  [address]\par Tel: [phone]\par Fax: [phone]\par \par [email or web address] [___] : [unfilled]

## 2023-05-12 NOTE — REASON FOR VISIT
[Follow-Up] : a follow-up visit for [S/P Cardiac Surgery] : status post cardiac surgery [S/P Catheterization] : status post catheterization [Pulmonary Stenosis] : pulmonary stenosis [Patient] : patient [FreeTextEntry1] : Pulmonary valve stenosis s/p valvotomy [FreeTextEntry3] : post-cath visit after TPVR

## 2023-05-12 NOTE — REVIEW OF SYSTEMS
[Feeling Poorly] : not feeling poorly (malaise) [Fever] : no fever [Eye Discharge] : no eye discharge [Nasal Stuffiness] : no nasal congestion [Sore Throat] : no sore throat [Cyanosis] : no cyanosis [Edema] : no edema [Diaphoresis] : not diaphoretic [Chest Pain] : no chest pain or discomfort [Exercise Intolerance] : no persistence of exercise intolerance [Palpitations] : no palpitations [Orthopnea] : no orthopnea [Fast HR] : no tachycardia [Tachypnea] : not tachypneic [Wheezing] : no wheezing [Cough] : no cough [Shortness Of Breath] : not expressed as feeling short of breath [Vomiting] : no vomiting [Diarrhea] : no diarrhea [Abdominal Pain] : no abdominal pain [Decrease In Appetite] : appetite not decreased [Fainting (Syncope)] : no fainting [Headache] : no headache [Dizziness] : no dizziness [Limping] : no limping [Rash] : no rash [Wound problems] : no wound problems [Easy Bruising] : no tendency for easy bruising [Easy Bleeding] : no ~M tendency for easy bleeding [Sleep Disturbances] : ~T no sleep disturbances [Hyperactive] : no hyperactive behavior [Depression] : no depression [Anxiety] : no anxiety

## 2023-05-12 NOTE — DISCUSSION/SUMMARY
[Needs SBE Prophylaxis] : [unfilled]  needs bacterial endocarditis prophylaxis. SBE prophylaxis is indicated for dental and invasive ENT procedures. (Circulation. 2007; 116: 9284-6112) [FreeTextEntry1] : PROBLEM LIST:\par 1. Mild AI.\par 2. Congenital PS.\par    a. s/p surgical valvotomy (10-Sept-1992, [facility]au).\par    b. s/p implant of [place] TPV 25 (18-Apr-2023, [facility]) for severe RV dilation.\par 3. 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 No PS or PI.\par \par In summary, RAMONA is a 33 year M with congenital PS s/p surgical valvotomy who presents for initial post-procedure follow up after undergoing percutaneous implant of a [place] TPV 25.  His history, physical exam, EKG, and echocardiogram are reassuring.  Specifically, he has not had any ventricular arrhythmias and his [place] valve is working well with no stenosis or insufficiency.  He is also symptomatically better.  He can resume routine follow up with the [facility] service - likely in November/December.  He should continue ASA and his other medications.  We should obtain a follow up cMRI in 12 months.\par \par In the interim, if there are any further questions, concerns or changes in his clinical status we would be happy to see him at that time.  The patient was instructed to return if he develops chest pain, palpitations, cyanosis, respiratory distress, exercise intolerance, syncope or any other concerning symptoms.  He does require SBE prophylaxis but no activity limitations.  He expressed understanding of and agreement with the plan.  All questions were answered.\par \par Thank you for allowing us to participate in the care of this patient.  Feel free to reach out to us with any further questions or concerns. [May participate in all age-appropriate activities] : [unfilled] May participate in all age-appropriate activities.

## 2023-05-12 NOTE — CARDIOLOGY SUMMARY
[Today's Date] : [unfilled] [FreeTextEntry1] : Sinus rhythm at a rate of 64 beats per minute with a normal IN interval.  There was incomplete RBBB and LVH.  There is no evidence of atrial enlargement or pre-excitation.  The QRS axis is normal.  The QTc is within normal limits with no ST or T-wave changes. [FreeTextEntry2] : See note for details.  Briefly, normally functioning [place] valve with no PS/PI. [de-identified] : MCOT monitor without documented arrhythmias to date.

## 2023-05-12 NOTE — PHYSICAL EXAM
[General Appearance - Alert] : alert [General Appearance - In No Acute Distress] : in no acute distress [General Appearance - Well Nourished] : well nourished [General Appearance - Well Developed] : well developed [General Appearance - Well-Appearing] : well appearing [Appearance Of Head] : the head was normocephalic [Facies] : there were no dysmorphic facial features [Sclera] : the conjunctiva were normal [Outer Ear] : the ears and nose were normal in appearance [Examination Of The Oral Cavity] : mucous membranes were moist and pink [Auscultation Breath Sounds / Voice Sounds] : breath sounds clear to auscultation bilaterally [Normal Chest Appearance] : the chest was normal in appearance [Apical Impulse] : quiet precordium with normal apical impulse [Heart Rate And Rhythm] : normal heart rate and rhythm [Heart Sounds] : normal S1 and S2 [Heart Sounds Gallop] : no gallops [Heart Sounds Pericardial Friction Rub] : no pericardial rub [Heart Sounds Click] : no clicks [Arterial Pulses] : normal upper and lower extremity pulses with no pulse delay [Edema] : no edema [Capillary Refill Test] : normal capillary refill [Systolic] : systolic [I] : a grade 1/6  [Ejection] : ejection [II] : a grade 2/4  [Bowel Sounds] : normal bowel sounds [Abdomen Soft] : soft [Nondistended] : nondistended [Abdomen Tenderness] : non-tender [Nail Clubbing] : no clubbing  or cyanosis of the fingers [Motor Tone] : normal muscle strength and tone [] : no rash [Skin Lesions] : no lesions [Skin Turgor] : normal turgor [FreeTextEntry1] : Significant bruising around the left groin site [Demonstrated Behavior - Infant Nonreactive To Parents] : interactive [Mood] : mood and affect were appropriate for age [Demonstrated Behavior] : normal behavior

## 2023-05-12 NOTE — HISTORY OF PRESENT ILLNESS
[FreeTextEntry1] : I had the pleasure of seeing RAMONA GUILLEN in the pediatric cardiology clinic of [facility] on May 12, 2023.  As you know, RAMONA is a 33 year M with valvar PS s/p surgical valvotomy on September 10, 1992 with Dr. León.  He recently underwent implant of a [employer]stock TPV 25 and is presenting for post-cath consultation.  His post-procedure course was complicated by fever and chills likely related to residual preservative in the valve fabric.  He did undergo a work up for bacterial infection which was negative.  Since his last visit he has been well with no significant concerns.  He reports that he has noticed a big improvement in overall well being since valve implant - more energy, no palpitations, better sleep.  He has not had chest pain, palpitations, syncope, respiratory distress, or cyanosis.\par

## 2023-05-19 ENCOUNTER — APPOINTMENT (OUTPATIENT)
Dept: PEDIATRIC CARDIOLOGY | Facility: CLINIC | Age: 33
End: 2023-05-19

## 2023-06-02 ENCOUNTER — APPOINTMENT (OUTPATIENT)
Dept: PEDIATRIC MEDICAL GENETICS | Facility: CLINIC | Age: 33
End: 2023-06-02
Payer: COMMERCIAL

## 2023-06-02 PROCEDURE — 96040: CPT | Mod: 95

## 2023-10-16 NOTE — DISCHARGE NOTE PROVIDER - PROVIDER RX CONTACT NUMBER
Detail Level: Detailed Prescription Strength Graduated Compression Stockings Recommendations: The patient was counseled that prescription strength graduated compression stockings should be worn for all waking hours. They will follow up with a venous specialist to monitor graduated compression stocking usage and their symptoms. [phone] Detail Level: Simple [phone]

## 2023-12-29 DIAGNOSIS — Z95.4 PRESENCE OF OTHER HEART-VALVE REPLACEMENT: ICD-10-CM

## 2024-01-03 ENCOUNTER — RX RENEWAL (OUTPATIENT)
Age: 34
End: 2024-01-03

## 2024-01-04 ENCOUNTER — RESULT REVIEW (OUTPATIENT)
Age: 34
End: 2024-01-04

## 2024-01-07 ENCOUNTER — RESULT CHARGE (OUTPATIENT)
Age: 34
End: 2024-01-07

## 2024-01-08 ENCOUNTER — APPOINTMENT (OUTPATIENT)
Dept: PEDIATRIC CARDIOLOGY | Facility: CLINIC | Age: 34
End: 2024-01-08
Payer: COMMERCIAL

## 2024-01-08 VITALS
SYSTOLIC BLOOD PRESSURE: 151 MMHG | OXYGEN SATURATION: 99 % | HEIGHT: 66.14 IN | WEIGHT: 128.09 LBS | DIASTOLIC BLOOD PRESSURE: 71 MMHG | HEART RATE: 77 BPM | BODY MASS INDEX: 20.59 KG/M2 | RESPIRATION RATE: 18 BRPM

## 2024-01-08 PROCEDURE — 99205 OFFICE O/P NEW HI 60 MIN: CPT | Mod: 25

## 2024-01-08 PROCEDURE — 93303 ECHO TRANSTHORACIC: CPT

## 2024-01-08 RX ORDER — BUPROPION HYDROCHLORIDE 100 MG/1
100 TABLET, FILM COATED ORAL DAILY
Refills: 0 | Status: DISCONTINUED | COMMUNITY
Start: 2022-12-19 | End: 2024-01-08

## 2024-01-08 RX ORDER — METOPROLOL SUCCINATE 50 MG/1
50 TABLET, EXTENDED RELEASE ORAL
Qty: 90 | Refills: 3 | Status: ACTIVE | COMMUNITY
Start: 2023-03-16 | End: 1900-01-01

## 2024-01-08 RX ORDER — AMOXICILLIN 500 MG/1
500 CAPSULE ORAL
Qty: 8 | Refills: 5 | Status: ACTIVE | COMMUNITY
Start: 2024-01-08 | End: 1900-01-01

## 2024-02-07 ENCOUNTER — NON-APPOINTMENT (OUTPATIENT)
Age: 34
End: 2024-02-07

## 2024-02-07 ENCOUNTER — APPOINTMENT (OUTPATIENT)
Dept: PEDIATRIC CARDIOLOGY | Facility: CLINIC | Age: 34
End: 2024-02-07
Payer: COMMERCIAL

## 2024-02-07 ENCOUNTER — OUTPATIENT (OUTPATIENT)
Dept: OUTPATIENT SERVICES | Age: 34
LOS: 1 days | End: 2024-02-07

## 2024-02-07 ENCOUNTER — APPOINTMENT (OUTPATIENT)
Dept: MRI IMAGING | Facility: HOSPITAL | Age: 34
End: 2024-02-07
Payer: COMMERCIAL

## 2024-02-07 DIAGNOSIS — Q25.6 STENOSIS OF PULMONARY ARTERY: ICD-10-CM

## 2024-02-07 DIAGNOSIS — Z86.79 PERSONAL HISTORY OF OTHER DISEASES OF THE CIRCULATORY SYSTEM: Chronic | ICD-10-CM

## 2024-02-07 DIAGNOSIS — I49.3 VENTRICULAR PREMATURE DEPOLARIZATION: ICD-10-CM

## 2024-02-07 PROCEDURE — 93015 CV STRESS TEST SUPVJ I&R: CPT

## 2024-02-07 PROCEDURE — 94681 O2 UPTK CO2 OUTP % O2 XTRC: CPT

## 2024-02-07 PROCEDURE — 75557 CARDIAC MRI FOR MORPH: CPT | Mod: 26

## 2024-02-07 PROCEDURE — 94010 BREATHING CAPACITY TEST: CPT

## 2024-02-20 ENCOUNTER — NON-APPOINTMENT (OUTPATIENT)
Age: 34
End: 2024-02-20

## 2024-02-23 ENCOUNTER — APPOINTMENT (OUTPATIENT)
Dept: PEDIATRIC CARDIOLOGY | Facility: CLINIC | Age: 34
End: 2024-02-23
Payer: COMMERCIAL

## 2024-02-23 PROCEDURE — 93272 ECG/REVIEW INTERPRET ONLY: CPT

## 2024-03-07 ENCOUNTER — APPOINTMENT (OUTPATIENT)
Dept: ENDOCRINOLOGY | Facility: CLINIC | Age: 34
End: 2024-03-07
Payer: COMMERCIAL

## 2024-03-07 VITALS
HEART RATE: 65 BPM | OXYGEN SATURATION: 99 % | DIASTOLIC BLOOD PRESSURE: 78 MMHG | SYSTOLIC BLOOD PRESSURE: 143 MMHG | TEMPERATURE: 98.7 F | BODY MASS INDEX: 20.73 KG/M2 | RESPIRATION RATE: 18 BRPM | HEIGHT: 66.14 IN | WEIGHT: 129 LBS

## 2024-03-07 DIAGNOSIS — F32.A ANXIETY DISORDER, UNSPECIFIED: ICD-10-CM

## 2024-03-07 DIAGNOSIS — R00.2 PALPITATIONS: ICD-10-CM

## 2024-03-07 DIAGNOSIS — Q22.1 CONGENITAL PULMONARY VALVE STENOSIS: ICD-10-CM

## 2024-03-07 DIAGNOSIS — R79.89 OTHER SPECIFIED ABNORMAL FINDINGS OF BLOOD CHEMISTRY: ICD-10-CM

## 2024-03-07 DIAGNOSIS — Z86.79 PERSONAL HISTORY OF OTHER DISEASES OF THE CIRCULATORY SYSTEM: ICD-10-CM

## 2024-03-07 DIAGNOSIS — Z82.49 FAMILY HISTORY OF ISCHEMIC HEART DISEASE AND OTHER DISEASES OF THE CIRCULATORY SYSTEM: ICD-10-CM

## 2024-03-07 DIAGNOSIS — Z82.79 FAMILY HISTORY OF OTHER CONGENITAL MALFORMATIONS, DEFORMATIONS AND CHROMOSOMAL ABNORMALITIES: ICD-10-CM

## 2024-03-07 DIAGNOSIS — F41.9 ANXIETY DISORDER, UNSPECIFIED: ICD-10-CM

## 2024-03-07 PROCEDURE — 99205 OFFICE O/P NEW HI 60 MIN: CPT

## 2024-03-07 NOTE — HISTORY OF PRESENT ILLNESS
[FreeTextEntry1] :   HPI: 33 year old male presenting for evaluation of abnormal adrenal hormones.   PMHx: [place] syndrome, valvular disease s/p pulmonary valve repair , congenital heart disease    Allergies: penicillin    ENDOCRINOPATHY Hx: Patient with history of congenital heart disease s/p various procedures. He presents with complaints of interment panick attacks, anxiety, palpitations and intermittent high BP. He has been monitoring with his Omron BP cup at home which appears to be normal but is high when he is at the doctor's office or upset. He has been following with his congenital heart disease specialist and was evaluated for pheochromocytoma. Blood work had showed both high and low metanephrines. He has presented today to endocrine for further evaluation.   He reports history of high testosterone as well.    LIFESTYLE FACTORS:   Eating patterns and weight history: States he eats health and is very active. Weight has remained stable. He reports he intermittent fasts which explains occasional ketones in the urine  Activity/Sleep: active and sleeps well.         Follow up care: PCP: Benita Seals        Surgical History: pulmonary valve replacement , heart surgery    Family History: DM- Thyroid- Autoimmune- Cancer- Other- Brother with high testosterone like him , VSD, heart surgery in father, pHTN   Social History: occupation- [employer]  support system- mother lives in [place], he visits often  ETOH use- denies Tobacco Hx- denies Additional substance use-

## 2024-03-07 NOTE — PHYSICAL EXAM
[Alert] : alert [Well Nourished] : well nourished [Well Developed] : well developed [No Acute Distress] : no acute distress [Normal Sclera/Conjunctiva] : normal sclera/conjunctiva [EOMI] : extra ocular movement intact [No Proptosis] : no proptosis [Thyroid Not Enlarged] : the thyroid was not enlarged [Normal Oropharynx] : the oropharynx was normal [No Thyroid Nodules] : no palpable thyroid nodules [No Respiratory Distress] : no respiratory distress [No Accessory Muscle Use] : no accessory muscle use [Clear to Auscultation] : lungs were clear to auscultation bilaterally [Normal Rate] : heart rate was normal [No Edema] : no peripheral edema [Pedal Pulses Normal] : the pedal pulses are present [Normal Bowel Sounds] : normal bowel sounds [Not Tender] : non-tender [Not Distended] : not distended [Normal Anterior Cervical Nodes] : no anterior cervical lymphadenopathy [Soft] : abdomen soft [Normal Posterior Cervical Nodes] : no posterior cervical lymphadenopathy [No Spinal Tenderness] : no spinal tenderness [Spine Straight] : spine straight [No Stigmata of Cushings Syndrome] : no stigmata of [place] Syndrome [Normal Gait] : normal gait [Normal Strength/Tone] : muscle strength and tone were normal [No Rash] : no rash [No Tremors] : no tremors [Oriented x3] : oriented to person, place, and time [Acanthosis Nigricans] : no acanthosis nigricans [Acne] : no acne [Hirsutism] : no hirsutism

## 2024-03-07 NOTE — ASSESSMENT
[FreeTextEntry1] : abnormal catecholamines on lab work : blood work report stating both low and high plasma catecholamines (noted in chart) along with possible elevated cortisol  blood work done at various time of the day.  pt reporting symptoms that would need evaluation for and ruling out pheochromocytoma Pt denies having abdominal CT done for further evaluation Will repeat testing between 8-9am fasting  AM cortisol, ACTH, plasma metanephrines alone with 24 hour urine testing, aldosterone level, renin activity level  history of elevated testosterone: T level of 1300 noted on phone  will repeat Free and total testosterone, LH, FSH

## 2024-03-14 ENCOUNTER — TRANSCRIPTION ENCOUNTER (OUTPATIENT)
Age: 34
End: 2024-03-14

## 2024-03-14 DIAGNOSIS — R79.89 OTHER SPECIFIED ABNORMAL FINDINGS OF BLOOD CHEMISTRY: ICD-10-CM

## 2024-03-14 LAB
ACTH SER-ACNC: 105 PG/ML
ALBUMIN SERPL ELPH-MCNC: 4.9 G/DL
ALDOSTERONE SERUM: 19.1 NG/DL
ALP BLD-CCNC: 64 U/L
ALT SERPL-CCNC: 78 U/L
ANION GAP SERPL CALC-SCNC: 12 MMOL/L
AST SERPL-CCNC: 113 U/L
BILIRUB SERPL-MCNC: 0.4 MG/DL
BUN SERPL-MCNC: 15 MG/DL
CALCIUM SERPL-MCNC: 9.7 MG/DL
CHLORIDE SERPL-SCNC: 104 MMOL/L
CHOLEST SERPL-MCNC: 181 MG/DL
CHOLEST/HDLC SERPL: 2.5 RATIO
CO2 SERPL-SCNC: 23 MMOL/L
CORTIS SERPL-MCNC: 20.9 UG/DL
CREAT SERPL-MCNC: 0.96 MG/DL
DHEA-S SERPL-MCNC: 269 UG/DL
EGFR: 108 ML/MIN/1.73M2
FSH SERPL-MCNC: 4 IU/L
GLUCOSE SERPL-MCNC: 103 MG/DL
HDLC SERPL-MCNC: 72 MG/DL
LDLC SERPL CALC-MCNC: 98 MG/DL
LH SERPL-ACNC: 5.3 IU/L
METANEPH 24H UR-MRATE: 173 UG/24 HR
METANEPHS 24H UR-MCNC: 63 UG/L
NONHDLC SERPL-MCNC: 109 MG/DL
NORMETANEPHRINE 24 HR URINE: 292 UG/24 HR
NORMETANEPHRINE 24H UR-MCNC: 106 UG/L
POTASSIUM SERPL-SCNC: 4.5 MMOL/L
PROT SERPL-MCNC: 7.3 G/DL
SHBG SERPL-SCNC: 79.1 NMOL/L
SODIUM SERPL-SCNC: 140 MMOL/L
TESTOST FREE SERPL-MCNC: 13.1 PG/ML
TESTOST SERPL-MCNC: 1073 NG/DL
TRIGL SERPL-MCNC: 57 MG/DL

## 2024-03-17 LAB
ANDROSTANOLONE SERPL-MCNC: 91 NG/DL
RENIN ACTIVITY, PLASMA: 1.22 NG/ML/HR

## 2024-03-18 LAB
METANEPHRINE, PL: 41.4 PG/ML
NORMETANEPHRINE, PL: 127.7 PG/ML

## 2024-03-25 ENCOUNTER — TRANSCRIPTION ENCOUNTER (OUTPATIENT)
Age: 34
End: 2024-03-25